# Patient Record
Sex: FEMALE | Race: WHITE | NOT HISPANIC OR LATINO | Employment: FULL TIME | URBAN - NONMETROPOLITAN AREA
[De-identification: names, ages, dates, MRNs, and addresses within clinical notes are randomized per-mention and may not be internally consistent; named-entity substitution may affect disease eponyms.]

---

## 2017-03-20 ENCOUNTER — SURGERY (OUTPATIENT)
Dept: SURGERY | Facility: OTHER | Age: 37
End: 2017-03-20

## 2017-03-20 ENCOUNTER — HISTORY (OUTPATIENT)
Dept: EMERGENCY MEDICINE | Facility: OTHER | Age: 37
End: 2017-03-20

## 2017-03-21 ENCOUNTER — HISTORY (OUTPATIENT)
Dept: MEDSURG UNIT | Facility: OTHER | Age: 37
End: 2017-03-21

## 2017-03-24 ENCOUNTER — COMMUNICATION - GICH (OUTPATIENT)
Dept: OBGYN | Facility: OTHER | Age: 37
End: 2017-03-24

## 2017-03-24 DIAGNOSIS — Z98.890 OTHER SPECIFIED POSTPROCEDURAL STATES: ICD-10-CM

## 2017-03-24 DIAGNOSIS — R19.00 INTRA-ABDOMINAL AND PELVIC SWELLING, MASS AND LUMP, UNSPECIFIED SITE: ICD-10-CM

## 2017-03-24 DIAGNOSIS — N80.9 ENDOMETRIOSIS: ICD-10-CM

## 2017-03-27 ENCOUNTER — COMMUNICATION - GICH (OUTPATIENT)
Dept: OBGYN | Facility: OTHER | Age: 37
End: 2017-03-27

## 2017-03-27 DIAGNOSIS — Z98.890 OTHER SPECIFIED POSTPROCEDURAL STATES: ICD-10-CM

## 2017-04-03 ENCOUNTER — HISTORY (OUTPATIENT)
Dept: OBGYN | Facility: OTHER | Age: 37
End: 2017-04-03

## 2017-04-03 ENCOUNTER — OFFICE VISIT - GICH (OUTPATIENT)
Dept: OBGYN | Facility: OTHER | Age: 37
End: 2017-04-03

## 2017-04-03 DIAGNOSIS — Z48.89 ENCOUNTER FOR OTHER SPECIFIED SURGICAL AFTERCARE (CODE): ICD-10-CM

## 2017-04-11 ENCOUNTER — COMMUNICATION - GICH (OUTPATIENT)
Dept: OBGYN | Facility: OTHER | Age: 37
End: 2017-04-11

## 2017-04-17 ENCOUNTER — HISTORY (OUTPATIENT)
Dept: OBGYN | Facility: OTHER | Age: 37
End: 2017-04-17

## 2017-04-17 ENCOUNTER — OFFICE VISIT - GICH (OUTPATIENT)
Dept: OBGYN | Facility: OTHER | Age: 37
End: 2017-04-17

## 2017-04-17 DIAGNOSIS — E89.40 ASYMPTOMATIC POSTPROCEDURAL OVARIAN FAILURE: ICD-10-CM

## 2017-04-17 DIAGNOSIS — Z48.89 ENCOUNTER FOR OTHER SPECIFIED SURGICAL AFTERCARE (CODE): ICD-10-CM

## 2017-04-17 ASSESSMENT — PATIENT HEALTH QUESTIONNAIRE - PHQ9: SUM OF ALL RESPONSES TO PHQ QUESTIONS 1-9: 11

## 2017-04-18 ENCOUNTER — COMMUNICATION - GICH (OUTPATIENT)
Dept: OBGYN | Facility: OTHER | Age: 37
End: 2017-04-18

## 2017-04-18 DIAGNOSIS — E89.40 ASYMPTOMATIC POSTPROCEDURAL OVARIAN FAILURE: ICD-10-CM

## 2017-05-03 ENCOUNTER — HISTORY (OUTPATIENT)
Dept: OBGYN | Facility: OTHER | Age: 37
End: 2017-05-03

## 2017-05-03 ENCOUNTER — OFFICE VISIT - GICH (OUTPATIENT)
Dept: OBGYN | Facility: OTHER | Age: 37
End: 2017-05-03

## 2017-05-03 ENCOUNTER — COMMUNICATION - GICH (OUTPATIENT)
Dept: OBGYN | Facility: OTHER | Age: 37
End: 2017-05-03

## 2017-05-03 DIAGNOSIS — E89.40 ASYMPTOMATIC POSTPROCEDURAL OVARIAN FAILURE: ICD-10-CM

## 2017-05-03 DIAGNOSIS — Z48.89 ENCOUNTER FOR OTHER SPECIFIED SURGICAL AFTERCARE (CODE): ICD-10-CM

## 2017-12-08 ENCOUNTER — COMMUNICATION - GICH (OUTPATIENT)
Dept: OBGYN | Facility: OTHER | Age: 37
End: 2017-12-08

## 2017-12-08 DIAGNOSIS — Z79.890 HORMONE REPLACEMENT THERAPY (POSTMENOPAUSAL): ICD-10-CM

## 2017-12-08 DIAGNOSIS — E89.40 ASYMPTOMATIC POSTPROCEDURAL OVARIAN FAILURE: ICD-10-CM

## 2018-01-02 ENCOUNTER — COMMUNICATION - GICH (OUTPATIENT)
Dept: FAMILY MEDICINE | Facility: OTHER | Age: 38
End: 2018-01-02

## 2018-01-02 ENCOUNTER — COMMUNICATION - GICH (OUTPATIENT)
Dept: OBGYN | Facility: OTHER | Age: 38
End: 2018-01-02

## 2018-01-02 DIAGNOSIS — E89.40 ASYMPTOMATIC POSTPROCEDURAL OVARIAN FAILURE: ICD-10-CM

## 2018-01-02 DIAGNOSIS — Z79.890 HORMONE REPLACEMENT THERAPY (POSTMENOPAUSAL): ICD-10-CM

## 2018-01-04 NOTE — PROGRESS NOTES
Patient Information     Patient Name MRN Marlee Squires [id number] Female 1980      Progress Notes by Mack Lynn MD at 4/3/2017  2:30 PM     Author:  Mack Lynn MD Service:  (none) Author Type:  Physician     Filed:  4/3/2017  3:05 PM Encounter Date:  4/3/2017 Status:  Signed     :  Mack Lynn MD (Physician)            Marlee Wise a post-operative check after a laparotomy with BSO for endometriosis.  She is 2 week(s) status postop.  She reports doing well and denies fever, pain, or significant bleeding. She admits hot flashes & night sweats.    OBJECTIVE: /80  Pulse 72  LMP 2017 (Approximate)  Breastfeeding? No    Abd: soft, non-tender, non-distended.  Incision: clear, dry, and intact without evidence of infection.      HEMOGLOBIN                (g/dL)    Date Value   2017 13.8        ASSESSMENT :  Normal postop check.  Doing well.  Restrictions reviewed.  Will slowly advance to normal activity. Discussed smoking cessation.  Not ready to set quit date yet.    Plan:  Follow up in 2 weeks.  Increase Estradiol to 2 mg a day.  No work yet.    Mack Lynn MD  3:00 PM 4/3/2017

## 2018-01-04 NOTE — NURSING NOTE
Patient Information     Patient Name MRN Sex Marlee Joseph [id number] Female 1980      Nursing Note by Khanh Logan LPN at 2017  3:45 PM     Author:  Khanh Logan LPN Service:  (none) Author Type:  NURS- Licensed Practical Nurse     Filed:  2017  4:11 PM Encounter Date:  2017 Status:  Signed     :  Khanh Logan LPN (NURS- Licensed Practical Nurse)            Patient presents to the clinic for her 4 week post op. Hormonal and left side is now bugging her.  Khanh Logan LPN ..............2017 3:53 PM

## 2018-01-04 NOTE — NURSING NOTE
Patient Information     Patient Name MRN Sex Marlee Joseph [id number] Female 1980      Nursing Note by Khanh Logan LPN at 4/3/2017  2:30 PM     Author:  Khanh Logan LPN Service:  (none) Author Type:  NURS- Licensed Practical Nurse     Filed:  4/3/2017  2:20 PM Encounter Date:  4/3/2017 Status:  Signed     :  Khanh Logan LPN (NURS- Licensed Practical Nurse)            Patient presents to the clinic for her 2 week post op.  Khanh Logan LPN ..............4/3/2017 2:20 PM

## 2018-01-04 NOTE — TELEPHONE ENCOUNTER
Patient Information     Patient Name MRN Marlee Squires [id number] Female 1980      Telephone Encounter by Basia Alcantar at 3/27/2017 10:12 AM     Author:  Basia Alcantar Service:  (none) Author Type:  (none)     Filed:  3/27/2017 10:16 AM Encounter Date:  3/27/2017 Status:  Signed     :  Basia Alcantar            Pt states she has some bleeding when wiping and blood in toilet after urinating, right side pain at a 9/10 when she wakes up. Pt explains that she  is out of pain medication, last dose was 3/26/17 PM. Pt is having a hard time sleeping due to pain. Please advise.  Basia Alcantar LPN ....................  3/27/2017   10:15 AM

## 2018-01-04 NOTE — TELEPHONE ENCOUNTER
Patient Information     Patient Name MRN Sex Marlee Joseph [id number] Female 1980      Telephone Encounter by Carly Knight at 3/27/2017  3:19 PM     Author:  Carly Knight Service:  (none) Author Type:  NURS- Registered Nurse     Filed:  3/27/2017  4:28 PM Encounter Date:  3/27/2017 Status:  Signed     :  Carly Knight (NURS- Registered Nurse)            Rx placed up in unit 4 patient will bring a picture ID.  Patient has had some bleeding, patient will come in if going through a super pad and hour for 2 hours or if is having increased pain will go to the ED.  Carly Knight RN .............. 3/27/2017  4:27 PM

## 2018-01-04 NOTE — TELEPHONE ENCOUNTER
Patient Information     Patient Name MRN Sex Marlee Joseph [id number] Female 1980      Telephone Encounter by Carly Knight at 2017  9:36 AM     Author:  Carly Knight Service:  (none) Author Type:  NURS- Registered Nurse     Filed:  2017  9:46 AM Encounter Date:  2017 Status:  Signed     :  Carly Knight (NURS- Registered Nurse)            Patient is needing a note for work surgery was 2017 and needs to be off 6 weeks tentatively returning to work on 2017.  Carly Knight RN .............. 2017  9:39 AM

## 2018-01-04 NOTE — TELEPHONE ENCOUNTER
Patient Information     Patient Name MRN Sex Marlee Joseph [id number] Female 1980      Telephone Encounter by Carly Knight at 3/24/2017  4:05 PM     Author:  Carly Knight Service:  (none) Author Type:  NURS- Registered Nurse     Filed:  3/24/2017  4:06 PM Encounter Date:  3/24/2017 Status:  Signed     :  Carly Knight (NURS- Registered Nurse)            Called patient Rx at unit 4 window she will bring a picture ID.  Carly Knight RN .............. 3/24/2017  4:06 PM

## 2018-01-04 NOTE — TELEPHONE ENCOUNTER
Patient Information     Patient Name MRN Sex Marlee Joseph [id number] Female 1980      Telephone Encounter by Carly Knight at 3/24/2017  9:33 AM     Author:  Carly Knight Service:  (none) Author Type:  NURS- Registered Nurse     Filed:  3/24/2017  9:46 AM Encounter Date:  3/24/2017 Status:  Signed     :  Carly Knight (NURS- Registered Nurse)            Patient had surgery with Dr. Lynn on 2017. Exploratory Laparotomy with Removal of Pelvic Mass, Bilateral Salpingoopherectomy.  Patient is still having pain and would like a refill of Percocet for the weekend does want to have to come in over the weekend.  Carly Knight RN .............. 3/24/2017  9:45 AM

## 2018-01-04 NOTE — ADDENDUM NOTE
Patient Information     Patient Name MRN Sex Marlee Joseph [id number] Female 1980      Addendum Note by Matheus Lynn MD at 5/3/2017 10:21 AM     Author:  Matheus Lynn MD Service:  (none) Author Type:  Physician     Filed:  5/3/2017 10:21 AM Encounter Date:  5/3/2017 Status:  Signed     :  Matheus Lynn MD (Physician)       Addended by: MATHEUS LYNN on: 5/3/2017 10:21 AM        Modules accepted: Orders

## 2018-01-04 NOTE — NURSING NOTE
Patient Information     Patient Name MRN Sex Marlee Joseph [id number] Female 1980      Nursing Note by Khanh Logan LPN at 5/3/2017  9:15 AM     Author:  Khanh Logan LPN Service:  (none) Author Type:  NURS- Licensed Practical Nurse     Filed:  5/3/2017  9:05 AM Encounter Date:  5/3/2017 Status:  Signed     :  Khanh Logan LPN (NURS- Licensed Practical Nurse)            Patient presents to the clinic for her 6 week post op follow up.  Khanh Logan LPN ..............5/3/2017 9:05 AM

## 2018-01-04 NOTE — TELEPHONE ENCOUNTER
Patient Information     Patient Name MRN Marlee Squires [id number] Female 1980      Telephone Encounter by Carly Knight at 2017  4:02 PM     Author:  Carly Knight Service:  (none) Author Type:  NURS- Registered Nurse     Filed:  2017  4:03 PM Encounter Date:  2017 Status:  Signed     :  Carly Knight (NURS- Registered Nurse)            Patient is needing a note for work in regards to how long she will be off.  Carly Knight RN .............. 2017  4:03 PM

## 2018-01-04 NOTE — TELEPHONE ENCOUNTER
Patient Information     Patient Name MRN Marlee Squires [id number] Female 1980      Telephone Encounter by Mack Lynn MD at 2017  2:32 PM     Author:  Mack Lynn MD Service:  (none) Author Type:  Physician     Filed:  2017  2:32 PM Encounter Date:  2017 Status:  Signed     :  Mack Lynn MD (Physician)            New Rx for Enjuvia sent to her pharmacy.  Please let her know.    Mack Lynn MD ....................  2017   2:32 PM

## 2018-01-04 NOTE — ADDENDUM NOTE
Patient Information     Patient Name MRN Sex Marlee Joseph [id number] Female 1980      Addendum Note by Matheus Lynn MD at 3/27/2017  2:43 PM     Author:  Matheus Lynn MD Service:  (none) Author Type:  Physician     Filed:  3/27/2017  2:43 PM Encounter Date:  3/27/2017 Status:  Signed     :  Matheus Lynn MD (Physician)       Addended by: MATHEUS LYNN on: 3/27/2017 02:43 PM        Modules accepted: Orders

## 2018-01-04 NOTE — TELEPHONE ENCOUNTER
Patient Information     Patient Name MRN Marlee Squires [id number] Female 1980      Telephone Encounter by Carly Knight at 2017  2:04 PM     Author:  Carly Knight Service:  (none) Author Type:  NURS- Registered Nurse     Filed:  2017  2:09 PM Encounter Date:  2017 Status:  Signed     :  Carly Knight (NURS- Registered Nurse)            You prescribed estrogens conj synthetic (CENESTIN) 1.25 mg tablet.  This medication was discontinued by the  and is no longer made.  Needs new medication sent to [facility].  Carly Knight RN .............. 2017  2:07 PM

## 2018-01-04 NOTE — PROGRESS NOTES
Patient Information     Patient Name MRN Sex Marlee Joseph [id number] Female 1980      Progress Notes by Mack Lynn MD at 5/3/2017  9:15 AM     Author:  Mack Lynn MD Service:  (none) Author Type:  Physician     Filed:  5/3/2017  9:28 AM Encounter Date:  5/3/2017 Status:  Signed     :  Mack Lynn MD (Physician)            Marlee Wise a post-operative check after an exploratory laparotomy and BSO for stage 4 endometriosis.  She is 6 week(s) status postop.  She reports doing well and denies fever, pain, or significant bleeding.     OBJECTIVE: /80  Pulse 80  Breastfeeding? No    Abd: soft, non-tender, non-distended.  Incision: clear, dry, and intact without evidence of infection.      HEMOGLOBIN                (g/dL)    Date Value   2017 13.8        ASSESSMENT :  Normal postop check  Doing well.  Restrictions reviewed.  Contraception discussed.  Will advance to normal activity.    Plan:  Follow up prn.    Mack Lynn MD  9:22 AM 5/3/2017

## 2018-01-04 NOTE — TELEPHONE ENCOUNTER
Patient Information     Patient Name MRN Marlee Squires [id number] Female 1980      Telephone Encounter by Mack Lynn MD at 5/3/2017 10:21 AM     Author:  Mack Lynn MD Service:  (none) Author Type:  Physician     Filed:  5/3/2017 10:22 AM Encounter Date:  5/3/2017 Status:  Signed     :  Mack Lynn MD (Physician)            New Rx for ultra low dose pill with both estrogen & progesterone sent to her pharmacy.  Please let her know.    Mack Lynn MD ....................  5/3/2017   10:22 AM

## 2018-01-04 NOTE — PROGRESS NOTES
Patient Information     Patient Name MRN Sex Marlee Joseph [id number] Female 1980      Progress Notes by Mack Lynn MD at 2017  3:45 PM     Author:  Mack Lynn MD Service:  (none) Author Type:  Physician     Filed:  2017  4:46 PM Encounter Date:  2017 Status:  Signed     :  Mack Lynn MD (Physician)            Marlee Wise a post-operative check after an abdominal BSO.  She is 4 week(s) status postop.  She reports doing ok and denies fever or significant bleeding. She admits continued lower abdominal pain, hot flashes & night sweats.    OBJECTIVE: /90  Pulse 80  LMP 2017 (Approximate)  Breastfeeding? No    Abd: soft, minimally tender, non-distended.  Incision: clear, dry, and intact without evidence of infection with tenderness along the incision line..      HEMOGLOBIN                (g/dL)    Date Value   2017 13.8        ASSESSMENT :    ICD-10-CM    1. Surgical menopause E89.40 estrogens conj synthetic (CENESTIN) 1.25 mg tablet   2. Post op pain    Doing well.  Restrictions reviewed.  Trial of Cenestion rather than estradiol.  Will continue advance to normal activity.    Plan:  Follow up in 2 weeks or as needed.    Mack Lynn MD  4:42 PM 2017

## 2018-01-04 NOTE — TELEPHONE ENCOUNTER
Patient Information     Patient Name MRN Sex Marlee Joseph [id number] Female 1980      Telephone Encounter by Carly Knight at 2017  9:44 AM     Author:  Carly Knight Service:  (none) Author Type:  NURS- Registered Nurse     Filed:  2017  9:46 AM Encounter Date:  2017 Status:  Signed     :  Carly Knight (NURS- Registered Nurse)            Letter done printed given to Dr. Lynn to be signed.  Carly Knight RN .............. 2017  9:45 AM

## 2018-01-25 ENCOUNTER — DOCUMENTATION ONLY (OUTPATIENT)
Dept: FAMILY MEDICINE | Facility: OTHER | Age: 38
End: 2018-01-25

## 2018-01-25 PROBLEM — Z98.890 S/P EXPLORATORY LAPAROTOMY: Status: ACTIVE | Noted: 2018-01-25

## 2018-01-25 PROBLEM — E89.40 SURGICAL MENOPAUSE ON HORMONE REPLACEMENT THERAPY: Status: ACTIVE | Noted: 2018-01-25

## 2018-01-25 PROBLEM — N80.9 ENDOMETRIOSIS: Status: ACTIVE | Noted: 2018-01-25

## 2018-01-25 PROBLEM — Z79.890 SURGICAL MENOPAUSE ON HORMONE REPLACEMENT THERAPY: Status: ACTIVE | Noted: 2018-01-25

## 2018-01-25 RX ORDER — ESTRADIOL 1 MG/1
2 TABLET ORAL DAILY
COMMUNITY
Start: 2018-01-02 | End: 2018-07-30

## 2018-01-26 VITALS — DIASTOLIC BLOOD PRESSURE: 80 MMHG | SYSTOLIC BLOOD PRESSURE: 120 MMHG | HEART RATE: 72 BPM

## 2018-01-26 VITALS — SYSTOLIC BLOOD PRESSURE: 120 MMHG | HEART RATE: 80 BPM | DIASTOLIC BLOOD PRESSURE: 80 MMHG

## 2018-01-26 VITALS — SYSTOLIC BLOOD PRESSURE: 130 MMHG | HEART RATE: 80 BPM | DIASTOLIC BLOOD PRESSURE: 90 MMHG

## 2018-01-31 ASSESSMENT — PATIENT HEALTH QUESTIONNAIRE - PHQ9: SUM OF ALL RESPONSES TO PHQ QUESTIONS 1-9: 11

## 2018-02-12 NOTE — TELEPHONE ENCOUNTER
Patient Information     Patient Name MRN Sex Marlee Joseph [id number] Female 1980      Telephone Encounter by iNmo Sanchez RN at 2018  2:07 PM     Author:  Nimo Sanchez RN Service:  (none) Author Type:  NURS- Registered Nurse     Filed:  2018  2:07 PM Encounter Date:  2018 Status:  Signed     :  Nimo Sanchez RN (NURS- Registered Nurse)            After name and date of birth were verified, patient notified.  Nimo Sanchez RN ....................  2018   2:07 PM

## 2018-02-12 NOTE — TELEPHONE ENCOUNTER
Patient Information     Patient Name MRN Marlee Squires [id number] Female 1980      Telephone Encounter by Mack Lynn MD at 2018  2:00 PM     Author:  Mack Lynn MD Service:  (none) Author Type:  Physician     Filed:  2018  2:00 PM Encounter Date:  2018 Status:  Signed     :  Mack Lynn MD (Physician)            Order signed.  Please let her know.    Mack Lynn MD ....................  2018   2:00 PM

## 2018-02-12 NOTE — TELEPHONE ENCOUNTER
Patient Information     Patient Name MRN Marlee Squires [id number] Female 1980      Telephone Encounter by Loren Collins RN at 2017 10:19 AM     Author:  Loren Collins RN Service:  (none) Author Type:  NURS- Registered Nurse     Filed:  2017 10:24 AM Encounter Date:  2017 Status:  Signed     :  Loren Collins RN (NURS- Registered Nurse)            Pharmacy calling as patient was prescribed synthetic conjugated estrogen (Enjuvia) on 4/3/2017. This medication has been discontinued by the . Pharmacy requesting an alternative be sent in - possibly premarin. Please advise.  Loren Collins RN............. 2017 10:21 AM

## 2018-02-12 NOTE — TELEPHONE ENCOUNTER
Patient Information     Patient Name MRN Marlee Squires [id number] Female 1980      Telephone Encounter by Nimo Sanchez RN at 2018  1:36 PM     Author:  Nimo Sanchez RN Service:  (none) Author Type:  NURS- Registered Nurse     Filed:  2018  1:56 PM Encounter Date:  2018 Status:  Signed     :  Nimo Sanchez RN (NURS- Registered Nurse)            Patient reports she has been taking estradiol 2 mg daily instead of the 1 mg prescribed. She is now out and unable to fill more due to insurance quantity limits. She was taking this dose in 2017, prior to switching to Enjuvia 1.25 mg daily.     Order khadar'd up for 2 mg daily. Please sign if appropriate.    Nimo Sanchez RN ....................  2018   1:54 PM

## 2018-02-12 NOTE — TELEPHONE ENCOUNTER
Patient Information     Patient Name MRN Marlee Squires [id number] Female 1980      Telephone Encounter by Loren Collins RN at 2017 11:46 AM     Author:  Loren Collins RN Service:  (none) Author Type:  NURS- Registered Nurse     Filed:  2017 11:48 AM Encounter Date:  2017 Status:  Signed     :  Loren Collins RN (NURS- Registered Nurse)            Pharmacy notified.  Loren Collins RN............. 2017 11:46 AM

## 2018-02-22 ENCOUNTER — NURSE TRIAGE (OUTPATIENT)
Dept: OBGYN | Facility: OTHER | Age: 38
End: 2018-02-22

## 2018-02-22 NOTE — TELEPHONE ENCOUNTER
Patient had BSO in March 2017. She now has cramping and bleeding. She has not had a cycle since surgery. Flow is light and cramping is 8/10, very similar to her regular cycles. Patient was offered routine office visit to follow up, declined at this time but will call back if bleeding is prolonged.    Nimo Sanchez RN...................2/22/2018 12:06 PM       Reason for Disposition    Postmenopausal vaginal bleeding (all triage questions negative)    Additional Information    Negative: Shock suspected (e.g., cold/pale/clammy skin, too weak to stand, low BP, rapid pulse)    Negative: Difficult to awaken or acting confused  (e.g., disoriented, slurred speech)    Negative: Passed out (i.e., lost consciousness, collapsed and was not responding)    Negative: Sounds like a life-threatening emergency to the triager    Negative: Followed a genital area injury    Negative: [1] Vaginal discharge is main symptom AND [2] small amount of blood    Negative: SEVERE abdominal pain    Negative: SEVERE dizziness (e.g., unable to stand, requires support to walk, feels like passing out now)    Negative: Sexual assault or rape    Negative: SEVERE vaginal bleeding (i.e., soaking 2 pads or tampons per hour and present 2 or more hours)    Negative: Patient sounds very sick or weak to the triager    Negative: MODERATE vaginal bleeding (i.e., soaking 1 pad or tampon per hour and present > 6 hours)    Negative: Pale skin (pallor) of new onset or worsening    Negative: [1] Constant abdominal pain AND [2] present > 2 hours    Negative: Taking Coumadin (warfarin) or other strong blood thinner, or known bleeding disorder (e.g., thrombocytopenia)    Negative: Bleeding with > 6 soaked pads or tampons per day    Negative: Bleeding lasts for > 7 days    Negative: [1] Bleeding/spotting after procedure (e.g., biopsy) or pelvic examination (e.g., pap smear) AND [2] lasts > 7 days    Answer Assessment - Initial Assessment Questions  1. AMOUNT:  "\"Describe the bleeding that you are having.\" \"How much bleeding is there?\"     - SPOTTING: spotting, or pinkish / brownish mucous discharge; does not fill panti-liner or pad     - MILD:  less than 1 pad / hour; less than patient's  menstrual bleeding when she still had menstrual periods    - MODERATE: 1-2 pads / hour; small-medium blood clots (e.g., pea, grape, small coin)     - SEVERE: soaking 2 or more pads/hour for 2 or more hours; bleeding not contained by pads or continuous red blood from vagina; large blood clots (e.g., golf ball, large coin)       light  2. ONSET: \"When did the bleeding begin?\" \"Is it continuing now?\"      Possibly yesterday afternoon, for sure this AM  3. MENOPAUSE: \"When was your last menstrual period?\"       February or March 2017  4. ABDOMINAL PAIN: \"Do you have any pain?\" \"How bad is the pain?\"  (e.g., Scale 1-10; mild, moderate, or severe)    - MILD (1-3): doesn't interfere with normal activities, abdomen soft and not tender to touch     - MODERATE (4-7): interferes with normal activities or awakens from sleep, tender to touch     - SEVERE (8-10): excruciating pain, doubled over, unable to do any normal activities       8/10  5. BLOOD THINNERS: \"Do you take any blood thinners?\" (e.g., Coumadin/warfarin, Pradaxa/dabigatran, aspirin)      no  6. HORMONES: \"Are you taking any hormone medications, prescription or OTC?\" (e.g., birth control pills, estrogen)      Estradiol  7. CAUSE: \"What do you think is causing the bleeding?\" (e.g., recent gyn surgery, recent gyn procedure; known bleeding disorder, uterine cancer)        Unknown  8. HEMODYNAMIC STATUS: \"Are you weak or feeling lightheaded?\" If so, ask: \"Can you stand and walk normally?\"        denies  9. OTHER SYMPTOMS: \"What other symptoms are you having with the bleeding?\" (e.g., back pain, burning with urination, fever)      no    Protocols used: VAGINAL BLEEDING - POSTMENOPAUSAL-ADULT-AH      "

## 2018-07-23 NOTE — PROGRESS NOTES
Patient Information     Patient Name  Marlee Wise MRN  [id number] Sex  Female   1980      Letter by Mack Lynn MD at      Author:  Mack Lynn MD Service:  (none) Author Type:  (none)    Filed:   Encounter Date:  2017 Status:  (Other)           Marlee Wise  [address] [place] [id number]          2017    To whom it may concern:    Patient has been under my care since 2017 due to surgery.   Patient is to be off for six weeks,tentatively returning to work on 2017.        Sincerely,         Mack Lynn MD, FACOG

## 2018-07-30 DIAGNOSIS — R19.00 PELVIC MASS IN FEMALE: Primary | ICD-10-CM

## 2018-07-30 NOTE — LETTER
July 31, 2018      Marlee Wise  [address]  [facility] [address]        Dear Marlee,     This letter is to remind you that you are due for your annual exam with OB/Gyn. Your last comprehensive visit was more than 12 months ago.    A LIMITED refill of Estradiol has been requested from our OB/Gyn team. Additional refills require you to complete this appointment.    Please call the clinic at [phone] to schedule your appointment.    If you should require additional refills before your scheduled appointment, please contact your pharmacy and we will refill your medication until the date of yourappointment.    If you are no longer utilizing [facility], please call to let us know. Doing so will remove you from our call/contact list.      Thank you for choosing [facility] for your health care needs.    Sincerely,    Refill RN  [facility]

## 2018-07-31 NOTE — TELEPHONE ENCOUNTER
Medication is being requested for 1 time refill only due to:  Patient needs to be seen because it has been more than one year since last visit.Letter sent as reminder to patient.       Unable to complete prescription refill per RN Medication Refill Policy due to absence of prescribing provider.................... Nimo Sanchez ....................  7/31/2018   4:44 PM

## 2018-08-01 RX ORDER — ESTRADIOL 1 MG/1
TABLET ORAL
Qty: 60 TABLET | Refills: 0 | Status: SHIPPED | OUTPATIENT
Start: 2018-08-01 | End: 2018-09-04

## 2018-08-13 NOTE — TELEPHONE ENCOUNTER
No appointment made 2 weeks after refill was authorized. Follow up call placed today and patient was assisted with scheduling appointment.    Nimo Sanchez RN...................8/13/2018 9:03 AM

## 2018-08-16 ENCOUNTER — OFFICE VISIT (OUTPATIENT)
Dept: OBGYN | Facility: OTHER | Age: 38
End: 2018-08-16
Attending: OBSTETRICS & GYNECOLOGY
Payer: COMMERCIAL

## 2018-08-16 VITALS — SYSTOLIC BLOOD PRESSURE: 106 MMHG | HEART RATE: 64 BPM | DIASTOLIC BLOOD PRESSURE: 70 MMHG

## 2018-08-16 DIAGNOSIS — N80.9 ENDOMETRIOSIS: Primary | ICD-10-CM

## 2018-08-16 DIAGNOSIS — R10.2 PELVIC PAIN IN FEMALE: ICD-10-CM

## 2018-08-16 PROCEDURE — 82670 ASSAY OF TOTAL ESTRADIOL: CPT | Performed by: OBSTETRICS & GYNECOLOGY

## 2018-08-16 PROCEDURE — 99213 OFFICE O/P EST LOW 20 MIN: CPT | Performed by: OBSTETRICS & GYNECOLOGY

## 2018-08-16 PROCEDURE — 36415 COLL VENOUS BLD VENIPUNCTURE: CPT | Performed by: OBSTETRICS & GYNECOLOGY

## 2018-08-16 PROCEDURE — G0463 HOSPITAL OUTPT CLINIC VISIT: HCPCS

## 2018-08-16 ASSESSMENT — PAIN SCALES - GENERAL: PAINLEVEL: NO PAIN (0)

## 2018-08-16 NOTE — MR AVS SNAPSHOT
After Visit Summary   8/16/2018    Marlee Wise    MRN: [id number]           Patient Information     Date Of Birth          1980        Visit Information        Provider Department      8/16/2018 1:15 PM Chuck Flor MD [facility]        Today's Diagnoses     Endometriosis    -  1    Pelvic pain in female           Follow-ups after your visit        Your next 10 appointments already scheduled     Aug 20, 2018  2:00 PM CDT   US PELVIC COMPLETE W TRANSVAGINAL with GHUS1   [facility] ([facility]    [address]  [place] [id number] [phone]           Please bring a list of your medicines (including vitamins, minerals and over-the-counter drugs). Also, tell your doctor about any allergies you may have. Wear comfortable clothes and leave your valuables at home.  Adults: Drink four 8-ounce glasses of fluid an hour before your exam. If you need to empty your bladder before your exam, try to release only a little urine. Then, drink another glass of fluid.  Children: Children who are potty trained up to 6 years old should drink at least 2 cups (16 oz) of water/non-carbonated beverage 30 minutes prior to the exam. Children who are 6-10 years should drink at least 3 cups (24 oz) of water/non-carbonated beverage 45 minutes prior to the exam. Children who are 10 years or older should drink at least 4 cups (32 oz) of water/non-carbonated beverage 45 minutes prior to the exam. If your child is very uncomfortable or has an urgent need to pee, please notify a technologist; they will try to find out how much longer the wait may be and provide instructions to help relieve the pressure.  Please call the Imaging Department at your exam site with any questions.            Aug 20, 2018  3:00 PM CDT   SHORT with Chuck Flor MD   [facility] ([facility]    [address]  [place] [address]   [phone]              Future tests that were ordered for you today     Open Future Orders        Priority Expected Expires Ordered    US Pelvic Complete with Transvaginal Routine  2019            Who to contact     If you have questions or need follow up information about today's clinic visit or your schedule please contact [facility] directly at [phone].  Normal or non-critical lab and imaging results will be communicated to you by Trendslidehart, letter or phone within 4 business days after the clinic has received the results. If you do not hear from us within 7 days, please contact the clinic through YeahMobit or phone. If you have a critical or abnormal lab result, we will notify you by phone as soon as possible.  Submit refill requests through Panjiva or call your pharmacy and they will forward the refill request to us. Please allow 3 business days for your refill to be completed.          Additional Information About Your Visit        Panjiva Information     Panjiva lets you send messages to your doctor, view your test results, renew your prescriptions, schedule appointments and more. To sign up, go to [email or web address] . Click on \"Log in\" on the left side of the screen, which will take you to the Welcome page. Then click on \"Sign up Now\" on the right side of the page.     You will be asked to enter the access code listed below, as well as some personal information. Please follow the directions to create your username and password.     Your access code is: [email or web address]  Expires: 2018  2:47 PM     Your access code will  in 90 days. If you need help or a new code, please call your [facility] clinic or [phone].        Care EveryWhere ID     This is your Care EveryWhere ID. This could be used by other organizations to access your [facility] medical records  [id number]        Your Vitals Were     Pulse Breastfeeding?          "       64 No           Blood Pressure from Last 3 Encounters:   08/16/18 106/70   05/03/17 120/80   04/17/17 130/90    Weight from Last 3 Encounters:   No data found for Wt              We Performed the Following     Estradiol        Primary Care Provider Office Phone # Fax #    Meseret Taylro [phone]       [facility] [address]  [facility] [address]        Equal Access to Services     [facility] Center[facility]ESS : Hadii aad ku hadasho Soomaali, waaxda luqadaha, qaybta kaalmada adeegyada, waxay idiin hayaan adeeg kharash la'aan ah. So [facility] [phone].    ATENCIÓN: Si habla español, tiene a bear disposición servicios gratuitos de asistencia lingüística. Llame al [phone].    We comply with applicable federal civil rights laws and [place] laws. We do not discriminate on the basis of race, color, national origin, age, disability, sex, sexual orientation, or gender identity.            Thank you!     Thank you for choosing [facility]  for your care. Our goal is always to provide you with excellent care. Hearing back from our patients is one way we can continue to improve our services. Please take a few minutes to complete the written survey that you may receive in the mail after your visit with us. Thank you!             Your Updated Medication List - Protect others around you: Learn how to safely use, store and throw away your medicines at www.disposemymeds.org.          This list is accurate as of 8/16/18  2:47 PM.  Always use your most recent med list.                   Brand Name Dispense Instructions for use Diagnosis    estradiol 1 MG tablet    ESTRACE    60 tablet    TAKE 2 TABLETS BY MOUTH EVERY DAY    Pelvic mass in female

## 2018-08-16 NOTE — NURSING NOTE
Patient present for consult - states feeling increased fatigue, bloated, and no sex drive.  She feels the current Estradiol is not effective.   Alisa Rosario LPN........................8/16/2018  1:39 PM

## 2018-08-16 NOTE — PROGRESS NOTES
Patient is a 38-year-old here to discuss estrogen therapy and discomfort in her abdomen and pelvis.  In March 2017 had a fairly urgent laparotomy by Dr. Lynn.  Stage IV endometriosis was noted.  A BSO with lysis of adhesions was performed.  Uterus was left in place.  Reviewing the op report showed that her right ovary was enlarged 10 cm and left is 7 cm.  She was then placed on daily estradiol initially 1 mg.  About 6 months ago she was up to 2 mg daily.    Still with some vasomotor type symptoms but has chronic feeling of feeling bloated and fullness lower in her abdomen.  Also quite fatigued.  Suffers from intermittent depression.    Has not taken any progesterone since her surgery despite the estrogen therapy.  Describes for times of having light bleeding.  Definite dyspareunia on a regular basis.    Denies any issues or problems with either bladder function or bowel function.    Problem list is reviewed on epic in addition to the above includes tobacco abuse    Medications and allergies reviewed on [employer]    GYN  and GI review of systems are as noted above otherwise negative    Physical exam:    Patient is alert oriented appears in no acute distress blood pressure 106/70 pulse is 64 weight is declined    Lymph node survey is negative    Abdomen is soft benign, mild discomfort in the lower abdomen.  Previous Pfannenstiel incision.  On pelvic exam external vagina and cervix are unremarkable bimanual exam shows a cervix that is fairly high uterus is globular tender midline no obvious adnexal masses but patient guarded a bit so it was difficult to ascertain for sure    Assessment:    1.  History stage IV endometriosis status post laparotomy with BSO, uterus intact    2.  Continuous estrogen replacement with symptoms that would be suggestive of hypoestrogenism.    3.  Intermittent bleeding spotting on continuous estrogen    4.  Dyspareunia    Plan:    The above discussed with the patient.  Will check a serum  estradiol to assess her absorption of the estradiol 2 mg.  We will also arrange up arrange a pelvic ultrasound to assess the uterus the endometrium and the adnexal regions.  Follow-up with the patient after the studies are back.

## 2018-08-16 NOTE — LETTER
8/16/2018       RE: Marlee Wise  [address] [facility] [address]     Dear Colleague,    Thank you for referring your patient, Marlee Wise, to the [facility] at [facility]. Please see a copy of my visit note below.    Patient is a 38-year-old here to discuss estrogen therapy and discomfort in her abdomen and pelvis.  In March 2017 had a fairly urgent laparotomy by Dr. Lynn.  Stage IV endometriosis was noted.  A BSO with lysis of adhesions was performed.  Uterus was left in place.  Reviewing the op report showed that her right ovary was enlarged 10 cm and left is 7 cm.  She was then placed on daily estradiol initially 1 mg.  About 6 months ago she was up to 2 mg daily.    Still with some vasomotor type symptoms but has chronic feeling of feeling bloated and fullness lower in her abdomen.  Also quite fatigued.  Suffers from intermittent depression.    Has not taken any progesterone since her surgery despite the estrogen therapy.  Describes for times of having light bleeding.  Definite dyspareunia on a regular basis.    Denies any issues or problems with either bladder function or bowel function.    Problem list is reviewed on epic in addition to the above includes tobacco abuse    Medications and allergies reviewed on [employer]    GYN  and GI review of systems are as noted above otherwise negative    Physical exam:    Patient is alert oriented appears in no acute distress blood pressure 106/70 pulse is 64 weight is declined    Lymph node survey is negative    Abdomen is soft benign, mild discomfort in the lower abdomen.  Previous Pfannenstiel incision.  On pelvic exam external vagina and cervix are unremarkable bimanual exam shows a cervix that is fairly high uterus is globular tender midline no obvious adnexal masses but patient guarded a bit so it was difficult to ascertain for sure    Assessment:    1.  History stage IV endometriosis status post  laparotomy with BSO, uterus intact    2.  Continuous estrogen replacement with symptoms that would be suggestive of hypoestrogenism.    3.  Intermittent bleeding spotting on continuous estrogen    4.  Dyspareunia    Plan:    The above discussed with the patient.  Will check a serum estradiol to assess her absorption of the estradiol 2 mg.  We will also arrange up arrange a pelvic ultrasound to assess the uterus the endometrium and the adnexal regions.  Follow-up with the patient after the studies are back.    Again, thank you for allowing me to participate in the care of your patient.      Sincerely,    Chuck Flor MD

## 2018-08-17 ENCOUNTER — APPOINTMENT (OUTPATIENT)
Dept: LAB | Facility: OTHER | Age: 38
End: 2018-08-17
Attending: OBSTETRICS & GYNECOLOGY
Payer: COMMERCIAL

## 2018-08-17 LAB — ESTRADIOL SERPL-MCNC: 61 PG/ML

## 2018-08-20 ENCOUNTER — HOSPITAL ENCOUNTER (OUTPATIENT)
Dept: ULTRASOUND IMAGING | Facility: OTHER | Age: 38
Discharge: HOME | End: 2018-08-20
Attending: OBSTETRICS & GYNECOLOGY | Admitting: OBSTETRICS & GYNECOLOGY
Payer: COMMERCIAL

## 2018-08-20 ENCOUNTER — OFFICE VISIT (OUTPATIENT)
Dept: OBGYN | Facility: OTHER | Age: 38
End: 2018-08-20
Attending: OBSTETRICS & GYNECOLOGY
Payer: COMMERCIAL

## 2018-08-20 VITALS — DIASTOLIC BLOOD PRESSURE: 60 MMHG | HEART RATE: 68 BPM | SYSTOLIC BLOOD PRESSURE: 110 MMHG

## 2018-08-20 DIAGNOSIS — N80.9 ENDOMETRIOSIS: ICD-10-CM

## 2018-08-20 DIAGNOSIS — R10.2 PELVIC PAIN IN FEMALE: ICD-10-CM

## 2018-08-20 DIAGNOSIS — N80.9 ENDOMETRIOSIS: Primary | ICD-10-CM

## 2018-08-20 PROCEDURE — G0463 HOSPITAL OUTPT CLINIC VISIT: HCPCS | Mod: 25

## 2018-08-20 PROCEDURE — 76830 TRANSVAGINAL US NON-OB: CPT

## 2018-08-20 PROCEDURE — 99214 OFFICE O/P EST MOD 30 MIN: CPT | Performed by: OBSTETRICS & GYNECOLOGY

## 2018-08-20 RX ORDER — ACETAMINOPHEN AND CODEINE PHOSPHATE 120; 12 MG/5ML; MG/5ML
1 SOLUTION ORAL DAILY
Qty: 84 TABLET | Refills: 1 | Status: SHIPPED | OUTPATIENT
Start: 2018-08-20 | End: 2018-10-29

## 2018-08-20 ASSESSMENT — PAIN SCALES - GENERAL: PAINLEVEL: NO PAIN (0)

## 2018-08-20 NOTE — PROGRESS NOTES
Brenda is here for follow-up.  Please refer to my dictation from 8/16/2018.    Her serum estradiol level on the 2 mg of estradiol orally returned at 61 which is below the comfort range of most women which is between .    Her ultrasound shows a normal sized uterus with an endometrial plate that is basically 5 mm meters but there is an area that is closer to 8 mm.  There are no adnexal masses appreciated    I discussed the above at length with the patient.  Again her biggest concern is pelvic pain and discomfort.  Again has a history of stage IV endometriosis. At the time of urgent surgery a BSO was performed but the uterus was left in place presumably presumably secondary to significant adhesive disease.  She has since been on unopposed estrogen with periodic bleeding.    Assessment:    1.  Pelvic discomfort.  Potential for persistent endometriosis involving peritoneal surfaces.  No recurrent endometrioma.  Known pelvic adhesive disease probably contributes to her discomfort as well.  2.  Symptoms of hypoestrogenism with serum level confirming.  3.  On unopposed estrogen    Plan: We will have her increase to 2-1/2 mg of estradiol daily.  Discussed need for progesterone either in an oral form or in IUD form.  Patient declines the IUD.  Therefore we will consider daily Norethindrone (Micronor).  If pain persists may have to consider a hysterectomy.    We will plan follow-up in 1 month to assess the above.  30 minutes spent face-to-face all of it in counseling

## 2018-08-20 NOTE — LETTER
8/20/2018       RE: Marlee Wise  [address] [facility] [address]     Dear Colleague,    Thank you for referring your patient, Marlee Wise, to the [facility] at [facility]. Please see a copy of my visit note below.    Brenda is here for follow-up.  Please refer to my dictation from 8/16/2018.    Her serum estradiol level on the 2 mg of estradiol orally returned at 61 which is below the comfort range of most women which is between .    Her ultrasound shows a normal sized uterus with an endometrial plate that is basically 5 mm meters but there is an area that is closer to 8 mm.  There are no adnexal masses appreciated    I discussed the above at length with the patient.  Again her biggest concern is pelvic pain and discomfort.  Again has a history of stage IV endometriosis. At the time of urgent surgery a BSO was performed but the uterus was left in place presumably presumably secondary to significant adhesive disease.  She has since been on unopposed estrogen with periodic bleeding.    Assessment:    1.  Pelvic discomfort.  Potential for persistent endometriosis involving peritoneal surfaces.  No recurrent endometrioma.  Known pelvic adhesive disease probably contributes to her discomfort as well.  2.  Symptoms of hypoestrogenism with serum level confirming.  3.  On unopposed estrogen    Plan: We will have her increase to 2-1/2 mg of estradiol daily.  Discussed need for progesterone either in an oral form or in IUD form.  Patient declines the IUD.  Therefore we will consider daily Norethindrone (Micronor).  If pain persists may have to consider a hysterectomy.    We will plan follow-up in 1 month to assess the above.  30 minutes spent face-to-face all of it in counseling    Again, thank you for allowing me to participate in the care of your patient.      Sincerely,    Chuck Flor MD

## 2018-08-20 NOTE — MR AVS SNAPSHOT
After Visit Summary   8/20/2018    Marlee Wise    MRN: [id number]           Patient Information     Date Of Birth          1980        Visit Information        Provider Department      8/20/2018 3:15 PM Chuck Flor MD [facility]        Today's Diagnoses     Endometriosis    -  1    Pelvic pain in female           Follow-ups after your visit        Who to contact     If you have questions or need follow up information about today's clinic visit or your schedule please contact [facility] directly at [phone].  Normal or non-critical lab and imaging results will be communicated to you by Brevityhart, letter or phone within 4 business days after the clinic has received the results. If you do not hear from us within 7 days, please contact the clinic through Cabe na Mala or phone. If you have a critical or abnormal lab result, we will notify you by phone as soon as possible.  Submit refill requests through Cabe na Mala or call your pharmacy and they will forward the refill request to us. Please allow 3 business days for your refill to be completed.          Additional Information About Your Visit        MyChart Information     Cabe na Mala gives you secure access to your electronic health record. If you see a primary care provider, you can also send messages to your care team and make appointments. If you have questions, please call your primary care clinic.  If you do not have a primary care provider, please call [phone] and they will assist you.        Care EveryWhere ID     This is your Care EveryWhere ID. This could be used by other organizations to access your [place] medical records  [id number]        Your Vitals Were     Pulse Breastfeeding?                68 No           Blood Pressure from Last 3 Encounters:   08/20/18 110/60   08/16/18 106/70   05/03/17 120/80    Weight from Last 3 Encounters:   No data found for Wt              Today, you had the  following     No orders found for display         Today's Medication Changes          These changes are accurate as of 8/20/18  6:22 PM.  If you have any questions, ask your nurse or doctor.               Start taking these medicines.        Dose/Directions    norethindrone 0.35 MG per tablet   Commonly known as:  MICRONOR   Used for:  Endometriosis   Started by:  Chuck Flor MD        Dose:  1 tablet   Take 1 tablet (0.35 mg) by mouth daily   Quantity:  84 tablet   Refills:  1            Where to get your medicines      These medications were sent to [id number] - [place], [place] - [address]  [address], [facility] [address]     Phone:  [phone]     norethindrone 0.35 MG per tablet                Primary Care Provider Office Phone # Fax #    Meseret Taylor [phone]       [facility] [address] [facility] [address]        Equal Access to Services     [facility] CampusT[facility] AH: Hadii rachel espinoza hadasho Soomaali, waaxda luqadaha, qaybta kaalmada adeegyada, jose alejandro arroyon jessica reynaga . So [facility] [phone].    ATENCIÓN: Si habla español, tiene a bear disposición servicios gratuitos de asistencia lingüística. LlSt. Anthony's Hospital [phone].    We comply with applicable federal civil rights laws and [place] laws. We do not discriminate on the basis of race, color, national origin, age, disability, sex, sexual orientation, or gender identity.            Thank you!     Thank you for choosing [facility] AND \[facility]\""  for your care. Our goal is always to provide you with excellent care. Hearing back from our patients is one way we can continue to improve our services. Please take a few minutes to complete the written survey that you may receive in the mail after your visit with us. Thank you!             Your Updated Medication List - Protect others around you: Learn how to safely use, store and throw away your medicines at  www.disposemymeds.org.          This list is accurate as of 8/20/18  6:22 PM.  Always use your most recent med list.                   Brand Name Dispense Instructions for use Diagnosis    estradiol 1 MG tablet    ESTRACE    60 tablet    TAKE 2 TABLETS BY MOUTH EVERY DAY    Pelvic mass in female       norethindrone 0.35 MG per tablet    MICRONOR    84 tablet    Take 1 tablet (0.35 mg) by mouth daily    Endometriosis

## 2018-08-21 ENCOUNTER — HEALTH MAINTENANCE LETTER (OUTPATIENT)
Age: 38
End: 2018-08-21

## 2018-09-04 DIAGNOSIS — R19.00 PELVIC MASS IN FEMALE: ICD-10-CM

## 2018-09-04 RX ORDER — ESTRADIOL 1 MG/1
2.5 TABLET ORAL DAILY
Qty: 75 TABLET | Refills: 0 | Status: SHIPPED | OUTPATIENT
Start: 2018-09-04 | End: 2018-10-09

## 2018-09-04 NOTE — TELEPHONE ENCOUNTER
Per last visit note, patient to increase estradiol to 2.5 mg daily, start Micronor and follow up in 1 month. Limited refill provided.    Nimo Sanchez RN...................9/4/2018 4:17 PM    
77.1

## 2018-10-02 DIAGNOSIS — R19.00 PELVIC MASS IN FEMALE: ICD-10-CM

## 2018-10-02 RX ORDER — ESTRADIOL 1 MG/1
TABLET ORAL
Qty: 75 TABLET | Refills: 0 | OUTPATIENT
Start: 2018-10-02

## 2018-10-02 NOTE — TELEPHONE ENCOUNTER
"[facility] and [facility] request a Rx refill for Estradiol (Estrace).  Hormone replacement therapy protocol completed.  Pt's last exam with Dr. JUANIS Flor was on 8-20-18 where MD stated to follow up in one month.  This RN telephoned pt inquiring about above.  Pt states she \"has some\" Estrace pills left and will agree to be examined and follow up with MD.  Pt given appt time on 10-4-15 at 1530.  Pt verbalizes understanding.  Will hold on Rx refill until MD to examine pt.  Krysta Suero, RN on 10/2/2018 at 2:39 PM    "

## 2018-10-09 ENCOUNTER — OFFICE VISIT (OUTPATIENT)
Dept: OBGYN | Facility: OTHER | Age: 38
End: 2018-10-09
Attending: OBSTETRICS & GYNECOLOGY
Payer: COMMERCIAL

## 2018-10-09 VITALS — SYSTOLIC BLOOD PRESSURE: 118 MMHG | HEART RATE: 84 BPM | DIASTOLIC BLOOD PRESSURE: 76 MMHG

## 2018-10-09 DIAGNOSIS — R10.2 PELVIC PAIN IN FEMALE: ICD-10-CM

## 2018-10-09 DIAGNOSIS — N80.9 ENDOMETRIOSIS: Primary | ICD-10-CM

## 2018-10-09 PROCEDURE — 99214 OFFICE O/P EST MOD 30 MIN: CPT | Performed by: OBSTETRICS & GYNECOLOGY

## 2018-10-09 RX ORDER — ESTRADIOL 1 MG/1
2.5 TABLET ORAL DAILY
Qty: 75 TABLET | Refills: 0 | Status: SHIPPED | OUTPATIENT
Start: 2018-10-09 | End: 2018-11-04

## 2018-10-09 ASSESSMENT — ANXIETY QUESTIONNAIRES
7. FEELING AFRAID AS IF SOMETHING AWFUL MIGHT HAPPEN: SEVERAL DAYS
GAD7 TOTAL SCORE: 18
1. FEELING NERVOUS, ANXIOUS, OR ON EDGE: NEARLY EVERY DAY
6. BECOMING EASILY ANNOYED OR IRRITABLE: NEARLY EVERY DAY
3. WORRYING TOO MUCH ABOUT DIFFERENT THINGS: NEARLY EVERY DAY
2. NOT BEING ABLE TO STOP OR CONTROL WORRYING: NEARLY EVERY DAY
IF YOU CHECKED OFF ANY PROBLEMS ON THIS QUESTIONNAIRE, HOW DIFFICULT HAVE THESE PROBLEMS MADE IT FOR YOU TO DO YOUR WORK, TAKE CARE OF THINGS AT HOME, OR GET ALONG WITH OTHER PEOPLE: EXTREMELY DIFFICULT
5. BEING SO RESTLESS THAT IT IS HARD TO SIT STILL: MORE THAN HALF THE DAYS

## 2018-10-09 ASSESSMENT — PATIENT HEALTH QUESTIONNAIRE - PHQ9: 5. POOR APPETITE OR OVEREATING: NEARLY EVERY DAY

## 2018-10-09 ASSESSMENT — PAIN SCALES - GENERAL: PAINLEVEL: NO PAIN (0)

## 2018-10-09 NOTE — NURSING NOTE
Date of Surgery: 11/8/18  Type of Surgery: Diagnostic Laparoscopy with Lysis of adhesions  Surgeon: Dr. Chuck Flor MD      Patient's consents were signed and appropriate appointments were scheduled by the [facility] . Patient was given surgical folder which includes pre-operative bathing instructions related to the two packets of Hibiclens surgical prep provided. Surgical forms were faxed to [phone] and [phone], copies made and kept for informative purposes, and originals were delivered to Day-surgery. Patient denies questions at this time.    Nimo Sanchez RN...................10/9/2018 1:40 PM

## 2018-10-09 NOTE — MR AVS SNAPSHOT
After Visit Summary   10/9/2018    Marlee Wise    MRN: [id number]           Patient Information     Date Of Birth          1980        Visit Information        Provider Department      10/9/2018 12:45 PM Chuck Flor MD [facility]        Today's Diagnoses     Endometriosis    -  1    Pelvic pain in female           Follow-ups after your visit        Your next 10 appointments already scheduled     Oct 29, 2018 11:15 AM CDT   Office Visit with Sameera Vu PA-C   [facility] ([facility])    [address]  [place] [id number] [phone]           Bring a current list of meds and any records pertaining to this visit. For Physicals, please bring immunization records and any forms needing to be filled out. Please arrive 10 minutes early to complete paperwork.            Nov 15, 2018  3:00 PM CST   SHORT with Chuck Flor MD   [facility] ([facility])    [address]  [place] [id number] [phone]              Who to contact     If you have questions or need follow up information about today's clinic visit or your schedule please contact [facility] directly at [phone].  Normal or non-critical lab and imaging results will be communicated to you by [employer], letter or phone within 4 business days after the clinic has received the results. If you do not hear from us within 7 days, please contact the clinic through [employer] or phone. If you have a critical or abnormal lab result, we will notify you by phone as soon as possible.  Submit refill requests through [employer] or call your pharmacy and they will forward the refill request to us. Please allow 3 business days for your refill to be completed.          Additional Information About Your Visit        [employer] Information     [employer] gives you secure access to your electronic  health record. If you see a primary care provider, you can also send messages to your care team and make appointments. If you have questions, please call your primary care clinic.  If you do not have a primary care provider, please call [phone] and they will assist you.        Care EveryWhere ID     This is your Care EveryWhere ID. This could be used by other organizations to access your [facility] medical records  [id number]        Your Vitals Were     Pulse                   84            Blood Pressure from Last 3 Encounters:   10/09/18 118/76   08/20/18 110/60   08/16/18 106/70    Weight from Last 3 Encounters:   No data found for Wt              Today, you had the following     No orders found for display         Where to get your medicines      These medications were sent to [id number] - [place], [place] - [address]  [address], [facility] [address]     Phone:  [phone]     estradiol 1 MG tablet          Primary Care Provider Office Phone # Fax #    Meseret HAYDEN Claudia [phone]       [facility] [address] [facility] [address]        Equal Access to Services     [facility]: Hadii aad ku hadasho Soomaali, waaxda luqadaha, qaybta kaalmada adeegyada, jose alejandro bolanos hayidalian jessica reynaga . So [facility] [phone].    ATENCIÓN: Si habla español, tiene a bear disposición servicios gratuitos de asistencia lingüística. Ap al [phone].    We comply with applicable federal civil rights laws and [place] laws. We do not discriminate on the basis of race, color, national origin, age, disability, sex, sexual orientation, or gender identity.            Thank you!     Thank you for choosing [facility]  for your care. Our goal is always to provide you with excellent care. Hearing back from our patients is one way we can continue to improve our services. Please take a few minutes to complete the written survey  that you may receive in the mail after your visit with us. Thank you!             Your Updated Medication List - Protect others around you: Learn how to safely use, store and throw away your medicines at www.disposemymeds.org.          This list is accurate as of 10/9/18  7:50 PM.  Always use your most recent med list.                   Brand Name Dispense Instructions for use Diagnosis    estradiol 1 MG tablet    ESTRACE    75 tablet    Take 2.5 tablets (2.5 mg) by mouth daily        norethindrone 0.35 MG per tablet    MICRONOR    84 tablet    Take 1 tablet (0.35 mg) by mouth daily    Endometriosis

## 2018-10-09 NOTE — NURSING NOTE
"Patient presents today as a follow-up on being on Micronor. She states that she is \"going crazy\" with being on the Micronor. She is still having lower abdominal pain and sexual intercourse is painful.  Rosio Perea LPN  10/9/2018  12:54 PM             "

## 2018-10-10 ASSESSMENT — ANXIETY QUESTIONNAIRES: GAD7 TOTAL SCORE: 18

## 2018-10-10 ASSESSMENT — PATIENT HEALTH QUESTIONNAIRE - PHQ9: SUM OF ALL RESPONSES TO PHQ QUESTIONS 1-9: 14

## 2018-10-10 NOTE — PROGRESS NOTES
Marlee comes back for follow-up.  She has been taking 2-1/2 mg of the Estrace daily.  She has not tolerated well norethindrone.  No bleeding but does get a lot of cramping.  She is quite tearful today.  She is very tired of the pelvic pain.  Again has a history of stage IV endometriosis with significant adhesions.  Had emergency surgery which resulted in laparotomy and a BSO for large endometriomas.  Uterus remains.  The time of her surgery she was told that because of colon involvement this would be a very difficult surgery.  Does not have any bowel related symptoms however  Not interested in the progesterone IUD    Marlee and I discussed the situation at length.  She is gotten to the point where she would like to consider hysterectomy.  We reviewed the fact that she probably has extensive adhesions and that depending on the degree of involvement of the colon may or may not need surgery to the bowel.  At this point I think it would be appropriate to consider an open diagnostic laparoscopy to assess the pelvis to see if she is a candidate for a laparoscopic hysterectomy, and open hysterectomy or potentially even a referral to a larger center where they could be prepared for a more extensive surgery.  Patient is very much in agreement with this.  We discussed a diagnostic laparoscopy and discussed that if there were simple adhesions or issues that could be remedied at the time they would be done but purpose of the surgery was largely to assess the situation take pictures and allow us to have further discussion of what would be the most appropriate for her to proceed.    30 minutes spent face-to-face basically all of it in counseling time

## 2018-10-29 ENCOUNTER — TELEPHONE (OUTPATIENT)
Dept: FAMILY MEDICINE | Facility: OTHER | Age: 38
End: 2018-10-29

## 2018-10-29 ENCOUNTER — HOSPITAL ENCOUNTER (OUTPATIENT)
Dept: GENERAL RADIOLOGY | Facility: OTHER | Age: 38
Discharge: HOME | End: 2018-10-29
Attending: PHYSICIAN ASSISTANT | Admitting: PHYSICIAN ASSISTANT
Payer: COMMERCIAL

## 2018-10-29 ENCOUNTER — OFFICE VISIT (OUTPATIENT)
Dept: FAMILY MEDICINE | Facility: OTHER | Age: 38
End: 2018-10-29
Attending: PHYSICIAN ASSISTANT
Payer: COMMERCIAL

## 2018-10-29 VITALS
DIASTOLIC BLOOD PRESSURE: 70 MMHG | RESPIRATION RATE: 18 BRPM | TEMPERATURE: 98 F | HEART RATE: 71 BPM | BODY MASS INDEX: 27.55 KG/M2 | OXYGEN SATURATION: 95 % | WEIGHT: 186 LBS | SYSTOLIC BLOOD PRESSURE: 110 MMHG | HEIGHT: 69 IN

## 2018-10-29 DIAGNOSIS — Z01.818 PRE-OP EXAM: Primary | ICD-10-CM

## 2018-10-29 DIAGNOSIS — Z23 NEEDS FLU SHOT: ICD-10-CM

## 2018-10-29 DIAGNOSIS — M25.562 ACUTE PAIN OF LEFT KNEE: ICD-10-CM

## 2018-10-29 DIAGNOSIS — Z23 NEED FOR PROPHYLACTIC VACCINATION AND INOCULATION AGAINST INFLUENZA: ICD-10-CM

## 2018-10-29 LAB — HGB BLD-MCNC: 14 G/DL (ref 11.7–15.7)

## 2018-10-29 PROCEDURE — 90471 IMMUNIZATION ADMIN: CPT | Performed by: PHYSICIAN ASSISTANT

## 2018-10-29 PROCEDURE — 99214 OFFICE O/P EST MOD 30 MIN: CPT | Performed by: PHYSICIAN ASSISTANT

## 2018-10-29 PROCEDURE — G0463 HOSPITAL OUTPT CLINIC VISIT: HCPCS

## 2018-10-29 PROCEDURE — 85018 HEMOGLOBIN: CPT | Performed by: PHYSICIAN ASSISTANT

## 2018-10-29 PROCEDURE — 73560 X-RAY EXAM OF KNEE 1 OR 2: CPT | Mod: RT

## 2018-10-29 PROCEDURE — 90686 IIV4 VACC NO PRSV 0.5 ML IM: CPT | Performed by: PHYSICIAN ASSISTANT

## 2018-10-29 PROCEDURE — G0463 HOSPITAL OUTPT CLINIC VISIT: HCPCS | Mod: 25

## 2018-10-29 PROCEDURE — 36415 COLL VENOUS BLD VENIPUNCTURE: CPT | Performed by: PHYSICIAN ASSISTANT

## 2018-10-29 ASSESSMENT — ANXIETY QUESTIONNAIRES
2. NOT BEING ABLE TO STOP OR CONTROL WORRYING: NEARLY EVERY DAY
6. BECOMING EASILY ANNOYED OR IRRITABLE: NEARLY EVERY DAY
GAD7 TOTAL SCORE: 16
5. BEING SO RESTLESS THAT IT IS HARD TO SIT STILL: SEVERAL DAYS
7. FEELING AFRAID AS IF SOMETHING AWFUL MIGHT HAPPEN: NOT AT ALL
3. WORRYING TOO MUCH ABOUT DIFFERENT THINGS: NEARLY EVERY DAY
1. FEELING NERVOUS, ANXIOUS, OR ON EDGE: NEARLY EVERY DAY

## 2018-10-29 ASSESSMENT — PATIENT HEALTH QUESTIONNAIRE - PHQ9
5. POOR APPETITE OR OVEREATING: NEARLY EVERY DAY
SUM OF ALL RESPONSES TO PHQ QUESTIONS 1-9: 16

## 2018-10-29 NOTE — NURSING NOTE
"Date of Surgery: 11/8/18  Type of Surgery: Diagnostic Laparoscopy & Lysis of Adhesions  Surgeon: Dr. Chuck Flor  [facility]:  [facility]  Fax:     Fever/Chills or other infectious symptoms in past month: NO  >10lb weight loss in past two months: NO  O2 SAT: 95    Health Care Directive/Code status:  NO  Hx of blood transfusions:   NO   Td up to date:  2/1/98--HAS ALLERGY  History of VRE/MRSA:  NO Date:    Preoperative Evaluation: Obstructive Sleep Apnea screening    S: Snore -  Do you snore loudly? (louder than talking or loud enough to be heard through closed doors)NO  T: Tired - Do you often feel tired, fatigued, or sleepy during the daytime?YES  O: Observed - Has anyone ever observed you stop breathing during your sleep?NO  P: Pressure - Do you have or are you being treated for high blood pressure?NO  B: BMI - BMI greater than 35kg/m2?NO  A: Age - Age over 50 years old?NO  N: Neck - Neck circumference greater than 40 cm?NO  G: Gender - Gender: Male?NO    Total number of \"YES\" responses:  1    Scoring: Low risk of SANTANA 0-2  At Risk of SANTANA: >3 High Risk of SANTANA: 5-8    Christen Don LPN ...... 10/29/2018 11:23 AM      "
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
## 2018-10-29 NOTE — MR AVS SNAPSHOT
After Visit Summary   10/29/2018    Marlee Wise    MRN: [id number]           Patient Information     Date Of Birth          1980        Visit Information        Provider Department      10/29/2018 11:15 AM Sameera Vu PA-C [facility]'s Diagnoses     Pre-op exam    -  1    Acute pain of left knee          Care Instructions    Patient should take the following medications the morning of surgery with a small sip of water: hold all meds.  Patient was instructed to hold the following medications the morning of surgery: hold all meds.     Patient was advised to call our office and the surgical services with any change in condition or new symptoms if they were to develop between today and their surgical date.  Especially any cardiopulmonary symptoms or symptoms concerning for an infection.     Discontinue aspirin, aleve, naproxen and ibuprofen 7 days prior to surgery to reduce bleeding risk.  It is fine to take tylenol the week before surgery.  Hold vitamins and herbal remedies for 7 days before surgery.            Follow-ups after your visit        Additional Services     ORTHOPEDICS ADULT REFERRAL       Your provider has referred you to: [place]: [facility] [phone] [email or web address]    Please be aware that coverage of these services is subject to the terms and limitations of your health insurance plan.  Call member services at your health plan with any benefit or coverage questions.      Please bring the following to your appointment:    >>   Any x-rays, CTs or MRIs which have been performed.  Contact the facility where they were done to arrange for  prior to your scheduled appointment.    >>   List of current medications   >>   This referral request   >>   Any documents/labs given to you for this referral                  Follow-up notes from your care team     Return if symptoms worsen or fail to  improve.      Your next 10 appointments already scheduled     Nov 15, 2018  3:00 PM CST   SHORT with Chuck Flor MD   [facility] ([facility])    [address]  [place] [id number] [phone]              Future tests that were ordered for you today     Open Future Orders        Priority Expected Expires Ordered    XR Knee Standing AP Bilat [place] Bilat Lat Left Routine 10/29/2018 10/29/2019 10/29/2018    Hemoglobin Routine  10/29/2019 10/29/2018            Who to contact     If you have questions or need follow up information about today's clinic visit or your schedule please contact [facility] directly at [phone].  Normal or non-critical lab and imaging results will be communicated to you by Edxacthart, letter or phone within 4 business days after the clinic has received the results. If you do not hear from us within 7 days, please contact the clinic through Friend.lyt or phone. If you have a critical or abnormal lab result, we will notify you by phone as soon as possible.  Submit refill requests through Trustlook or call your pharmacy and they will forward the refill request to us. Please allow 3 business days for your refill to be completed.          Additional Information About Your Visit        Trustlook Information     Trustlook gives you secure access to your electronic health record. If you see a primary care provider, you can also send messages to your care team and make appointments. If you have questions, please call your primary care clinic.  If you do not have a primary care provider, please call [phone] and they will assist you.        Care EveryWhere ID     This is your Care EveryWhere ID. This could be used by other organizations to access your [place] medical records  [id number]        Your Vitals Were     Pulse Temperature Respirations Height Pulse Oximetry BMI (Body Mass Index)    71 98  F (36.7  C) 18 5'  "8.5\" (1.74 m) 95% 27.87 kg/m2       Blood Pressure from Last 3 Encounters:   10/29/18 110/70   10/09/18 118/76   08/20/18 110/60    Weight from Last 3 Encounters:   10/29/18 186 lb (84.4 kg)              We Performed the Following     ORTHOPEDICS ADULT REFERRAL          Today's Medication Changes          These changes are accurate as of 10/29/18 11:47 AM.  If you have any questions, ask your nurse or doctor.               Stop taking these medicines if you haven't already. Please contact your care team if you have questions.     norethindrone 0.35 MG per tablet   Commonly known as:  MICRONOR   Stopped by:  Sameera Vu PA-C                    Primary Care Provider Office Phone # Fax #    Meseret MAYLIN Batista [phone]       [facility] [address]  [facility] [address]        Equal Access to Services     [facility]: Hadii rachel ku hadasho Soomaali, waaxda luqadaha, qaybta kaalmada adeegyada, waxay marcusin hayloreta reynaga . So [facility] [phone].    ATENCIÓN: Si dionisio martin, tiene a bear disposición servicios gratuitos de asistencia lingüística. Llame al [phone].    We comply with applicable federal civil rights laws and [place] laws. We do not discriminate on the basis of race, color, national origin, age, disability, sex, sexual orientation, or gender identity.            Thank you!     Thank you for choosing [facility]  for your care. Our goal is always to provide you with excellent care. Hearing back from our patients is one way we can continue to improve our services. Please take a few minutes to complete the written survey that you may receive in the mail after your visit with us. Thank you!             Your Updated Medication List - Protect others around you: Learn how to safely use, store and throw away your medicines at www.disposemymeds.org.          This list is accurate as of 10/29/18 11:47 AM.  Always " use your most recent med list.                   Brand Name Dispense Instructions for use Diagnosis    estradiol 1 MG tablet    ESTRACE    75 tablet    Take 2.5 tablets (2.5 mg) by mouth daily

## 2018-10-29 NOTE — TELEPHONE ENCOUNTER
No acute concerns were appreciated on the initial read of the left knee xray. Final read by radiology is pending.   I placed an order to have a left knee MRI completed to rule out concerns prior to your ortho appointment.   Sameera Vu PA-C..................10/29/2018 12:36 PM

## 2018-10-29 NOTE — PROGRESS NOTES

## 2018-10-30 ASSESSMENT — ANXIETY QUESTIONNAIRES: GAD7 TOTAL SCORE: 16

## 2018-11-01 ENCOUNTER — TRANSFERRED RECORDS (OUTPATIENT)
Dept: HEALTH INFORMATION MANAGEMENT | Facility: CLINIC | Age: 38
End: 2018-11-01

## 2018-11-01 ENCOUNTER — HOSPITAL ENCOUNTER (OUTPATIENT)
Dept: MRI IMAGING | Facility: OTHER | Age: 38
Discharge: HOME | End: 2018-11-01
Attending: PHYSICIAN ASSISTANT | Admitting: PHYSICIAN ASSISTANT
Payer: COMMERCIAL

## 2018-11-01 DIAGNOSIS — M25.562 ACUTE PAIN OF LEFT KNEE: ICD-10-CM

## 2018-11-01 PROCEDURE — 73721 MRI JNT OF LWR EXTRE W/O DYE: CPT | Mod: LT

## 2018-11-04 DIAGNOSIS — N80.9 ENDOMETRIOSIS: Primary | ICD-10-CM

## 2018-11-05 ENCOUNTER — TRANSFERRED RECORDS (OUTPATIENT)
Dept: HEALTH INFORMATION MANAGEMENT | Facility: OTHER | Age: 38
End: 2018-11-05

## 2018-11-07 ENCOUNTER — ANESTHESIA EVENT (OUTPATIENT)
Dept: SURGERY | Facility: OTHER | Age: 38
End: 2018-11-07
Payer: COMMERCIAL

## 2018-11-07 NOTE — TELEPHONE ENCOUNTER
"Refill request from   For:  estradiol (ESTRACE) 1 MG tablet    LOV 10/29/2018 for pre-op    LOV with OB/GYN 10/9/2018-Dr. Chuck Flor    Discussed hysterectomy \"At this point I think it would be appropriate to consider an open diagnostic laparoscopy to assess the pelvis to see if she is a candidate for a laparoscopic hysterectomy, and open hysterectomy or potentially even a referral to a larger center where they could be prepared for a more extensive surgery.\"    Patient scheduled for diagnostic laparoscopy & lysis of adhesions tomorrow 11/8/2018.    Will route to Dr. Chuck Flor for consideration as medication may/may not be appropriate pending results of procedure.     Requested Prescriptions   Pending Prescriptions Disp Refills     estradiol (ESTRACE) 1 MG tablet [Pharmacy Med Name: ESTRADIOL 1MG TABLETS] 75 tablet 0     Sig: TAKE 2 AND 1/2 TABLETS(2.5 MG) BY MOUTH DAILY    Hormone Replacement Therapy Passed    11/4/2018 11:40 AM       Passed - Blood pressure under 140/90 in past 12 months    BP Readings from Last 3 Encounters:   10/29/18 110/70   10/09/18 118/76   08/20/18 110/60          Passed - Recent (12 mo) or future (30 days) visit within the authorizing provider's specialty    Patient had office visit in the last 12 months or has a visit in the next 30 days with authorizing provider or within the authorizing provider's specialty.  See \"Patient Info\" tab in inbasket, or \"Choose Columns\" in Meds & Orders section of the refill encounter.             Passed - Patient is 18 years of age or older       Passed - No active pregnancy on record       Passed - No positive pregnancy test on record in past 12 months        Unable to complete prescription refill per RN Medication Refill Policy.................... Joanna Lee ....................  11/7/2018   4:09 PM        "

## 2018-11-08 ENCOUNTER — HOSPITAL ENCOUNTER (OUTPATIENT)
Facility: OTHER | Age: 38
Discharge: HOME | End: 2018-11-08
Attending: OBSTETRICS & GYNECOLOGY | Admitting: OBSTETRICS & GYNECOLOGY
Payer: COMMERCIAL

## 2018-11-08 ENCOUNTER — ANESTHESIA (OUTPATIENT)
Dept: SURGERY | Facility: OTHER | Age: 38
End: 2018-11-08
Payer: COMMERCIAL

## 2018-11-08 ENCOUNTER — SURGERY (OUTPATIENT)
Age: 38
End: 2018-11-08

## 2018-11-08 VITALS
HEART RATE: 74 BPM | SYSTOLIC BLOOD PRESSURE: 109 MMHG | RESPIRATION RATE: 11 BRPM | BODY MASS INDEX: 27.87 KG/M2 | WEIGHT: 186 LBS | OXYGEN SATURATION: 95 % | DIASTOLIC BLOOD PRESSURE: 83 MMHG | TEMPERATURE: 97.4 F

## 2018-11-08 DIAGNOSIS — Z98.890 S/P EXPLORATORY LAPAROTOMY: Primary | ICD-10-CM

## 2018-11-08 DIAGNOSIS — N80.9 ENDOMETRIOSIS: ICD-10-CM

## 2018-11-08 PROCEDURE — 37000008 ZZH ANESTHESIA TECHNICAL FEE, 1ST 30 MIN: Performed by: OBSTETRICS & GYNECOLOGY

## 2018-11-08 PROCEDURE — 25000125 ZZHC RX 250: Performed by: NURSE ANESTHETIST, CERTIFIED REGISTERED

## 2018-11-08 PROCEDURE — 36000056 ZZH SURGERY LEVEL 3 1ST 30 MIN: Performed by: OBSTETRICS & GYNECOLOGY

## 2018-11-08 PROCEDURE — 25000128 H RX IP 250 OP 636: Performed by: OBSTETRICS & GYNECOLOGY

## 2018-11-08 PROCEDURE — 40000306 ZZH STATISTIC PRE PROC ASSESS II: Performed by: OBSTETRICS & GYNECOLOGY

## 2018-11-08 PROCEDURE — 25000128 H RX IP 250 OP 636: Performed by: NURSE ANESTHETIST, CERTIFIED REGISTERED

## 2018-11-08 PROCEDURE — 37000009 ZZH ANESTHESIA TECHNICAL FEE, EACH ADDTL 15 MIN: Performed by: OBSTETRICS & GYNECOLOGY

## 2018-11-08 PROCEDURE — 71000014 ZZH RECOVERY PHASE 1 LEVEL 2 FIRST HR: Performed by: OBSTETRICS & GYNECOLOGY

## 2018-11-08 PROCEDURE — 27210794 ZZH OR GENERAL SUPPLY STERILE: Performed by: OBSTETRICS & GYNECOLOGY

## 2018-11-08 PROCEDURE — 25000132 ZZH RX MED GY IP 250 OP 250 PS 637: Performed by: OBSTETRICS & GYNECOLOGY

## 2018-11-08 PROCEDURE — 49329 UNLSTD LAPS PX ABD PERTM&OMN: CPT | Performed by: OBSTETRICS & GYNECOLOGY

## 2018-11-08 PROCEDURE — 49320 DIAG LAPARO SEPARATE PROC: CPT | Performed by: NURSE ANESTHETIST, CERTIFIED REGISTERED

## 2018-11-08 PROCEDURE — 36000058 ZZH SURGERY LEVEL 3 EA 15 ADDTL MIN: Performed by: OBSTETRICS & GYNECOLOGY

## 2018-11-08 PROCEDURE — 71000027 ZZH RECOVERY PHASE 2 EACH 15 MINS: Performed by: OBSTETRICS & GYNECOLOGY

## 2018-11-08 RX ORDER — IBUPROFEN 600 MG/1
600 TABLET, FILM COATED ORAL EVERY 6 HOURS PRN
Qty: 30 TABLET | Refills: 1 | Status: SHIPPED | OUTPATIENT
Start: 2018-11-08 | End: 2019-02-08

## 2018-11-08 RX ORDER — PROPOFOL 10 MG/ML
INJECTION, EMULSION INTRAVENOUS PRN
Status: DISCONTINUED | OUTPATIENT
Start: 2018-11-08 | End: 2018-11-08

## 2018-11-08 RX ORDER — OXYCODONE HYDROCHLORIDE 5 MG/1
5-10 TABLET ORAL EVERY 4 HOURS PRN
Qty: 10 TABLET | Refills: 0 | Status: SHIPPED | OUTPATIENT
Start: 2018-11-08 | End: 2019-02-08

## 2018-11-08 RX ORDER — MEPERIDINE HYDROCHLORIDE 50 MG/ML
12.5 INJECTION INTRAMUSCULAR; INTRAVENOUS; SUBCUTANEOUS
Status: DISCONTINUED | OUTPATIENT
Start: 2018-11-08 | End: 2018-11-08 | Stop reason: HOSPADM

## 2018-11-08 RX ORDER — CEFAZOLIN SODIUM 1 G/3ML
1 INJECTION, POWDER, FOR SOLUTION INTRAMUSCULAR; INTRAVENOUS SEE ADMIN INSTRUCTIONS
Status: DISCONTINUED | OUTPATIENT
Start: 2018-11-08 | End: 2018-11-08 | Stop reason: HOSPADM

## 2018-11-08 RX ORDER — OXYCODONE AND ACETAMINOPHEN 10; 325 MG/1; MG/1
1 TABLET ORAL EVERY 6 HOURS PRN
Qty: 12 TABLET | Refills: 0 | Status: SHIPPED | OUTPATIENT
Start: 2018-11-08 | End: 2019-02-08

## 2018-11-08 RX ORDER — LIDOCAINE 40 MG/G
CREAM TOPICAL
Status: DISCONTINUED | OUTPATIENT
Start: 2018-11-08 | End: 2018-11-08 | Stop reason: HOSPADM

## 2018-11-08 RX ORDER — SODIUM CHLORIDE, SODIUM LACTATE, POTASSIUM CHLORIDE, CALCIUM CHLORIDE 600; 310; 30; 20 MG/100ML; MG/100ML; MG/100ML; MG/100ML
INJECTION, SOLUTION INTRAVENOUS CONTINUOUS
Status: DISCONTINUED | OUTPATIENT
Start: 2018-11-08 | End: 2018-11-08 | Stop reason: HOSPADM

## 2018-11-08 RX ORDER — DEXAMETHASONE SODIUM PHOSPHATE 4 MG/ML
INJECTION, SOLUTION INTRA-ARTICULAR; INTRALESIONAL; INTRAMUSCULAR; INTRAVENOUS; SOFT TISSUE PRN
Status: DISCONTINUED | OUTPATIENT
Start: 2018-11-08 | End: 2018-11-08

## 2018-11-08 RX ORDER — ONDANSETRON 4 MG/1
4 TABLET, ORALLY DISINTEGRATING ORAL
Status: CANCELLED | OUTPATIENT
Start: 2018-11-08

## 2018-11-08 RX ORDER — IBUPROFEN 200 MG
600 TABLET ORAL
Status: CANCELLED | OUTPATIENT
Start: 2018-11-08

## 2018-11-08 RX ORDER — KETOROLAC TROMETHAMINE 30 MG/ML
30 INJECTION, SOLUTION INTRAMUSCULAR; INTRAVENOUS ONCE
Status: COMPLETED | OUTPATIENT
Start: 2018-11-08 | End: 2018-11-08

## 2018-11-08 RX ORDER — HYDROCODONE BITARTRATE AND ACETAMINOPHEN 5; 325 MG/1; MG/1
1 TABLET ORAL
Status: CANCELLED | OUTPATIENT
Start: 2018-11-08

## 2018-11-08 RX ORDER — NEOSTIGMINE METHYLSULFATE 1 MG/ML
VIAL (ML) INJECTION PRN
Status: DISCONTINUED | OUTPATIENT
Start: 2018-11-08 | End: 2018-11-08

## 2018-11-08 RX ORDER — PROPOFOL 10 MG/ML
INJECTION, EMULSION INTRAVENOUS CONTINUOUS PRN
Status: DISCONTINUED | OUTPATIENT
Start: 2018-11-08 | End: 2018-11-08

## 2018-11-08 RX ORDER — BUPIVACAINE HYDROCHLORIDE 2.5 MG/ML
INJECTION, SOLUTION INFILTRATION; PERINEURAL PRN
Status: DISCONTINUED | OUTPATIENT
Start: 2018-11-08 | End: 2018-11-08 | Stop reason: HOSPADM

## 2018-11-08 RX ORDER — KETAMINE HYDROCHLORIDE 50 MG/ML
INJECTION, SOLUTION INTRAMUSCULAR; INTRAVENOUS PRN
Status: DISCONTINUED | OUTPATIENT
Start: 2018-11-08 | End: 2018-11-08

## 2018-11-08 RX ORDER — NALOXONE HYDROCHLORIDE 0.4 MG/ML
.1-.4 INJECTION, SOLUTION INTRAMUSCULAR; INTRAVENOUS; SUBCUTANEOUS
Status: DISCONTINUED | OUTPATIENT
Start: 2018-11-08 | End: 2018-11-08 | Stop reason: HOSPADM

## 2018-11-08 RX ORDER — FENTANYL CITRATE 50 UG/ML
25-50 INJECTION, SOLUTION INTRAMUSCULAR; INTRAVENOUS
Status: DISCONTINUED | OUTPATIENT
Start: 2018-11-08 | End: 2018-11-08 | Stop reason: HOSPADM

## 2018-11-08 RX ORDER — OXYCODONE HYDROCHLORIDE 5 MG/1
5 TABLET ORAL EVERY 4 HOURS PRN
Status: DISCONTINUED | OUTPATIENT
Start: 2018-11-08 | End: 2018-11-08

## 2018-11-08 RX ORDER — ACETAMINOPHEN 325 MG/1
975 TABLET ORAL ONCE
Status: COMPLETED | OUTPATIENT
Start: 2018-11-08 | End: 2018-11-08

## 2018-11-08 RX ORDER — ONDANSETRON 2 MG/ML
4 INJECTION INTRAMUSCULAR; INTRAVENOUS EVERY 30 MIN PRN
Status: DISCONTINUED | OUTPATIENT
Start: 2018-11-08 | End: 2018-11-08 | Stop reason: HOSPADM

## 2018-11-08 RX ORDER — LIDOCAINE HYDROCHLORIDE 20 MG/ML
INJECTION, SOLUTION INFILTRATION; PERINEURAL PRN
Status: DISCONTINUED | OUTPATIENT
Start: 2018-11-08 | End: 2018-11-08

## 2018-11-08 RX ORDER — ONDANSETRON 4 MG/1
4 TABLET, ORALLY DISINTEGRATING ORAL EVERY 30 MIN PRN
Status: DISCONTINUED | OUTPATIENT
Start: 2018-11-08 | End: 2018-11-08 | Stop reason: HOSPADM

## 2018-11-08 RX ORDER — OXYCODONE HYDROCHLORIDE 5 MG/1
5-10 TABLET ORAL EVERY 4 HOURS PRN
Status: DISCONTINUED | OUTPATIENT
Start: 2018-11-08 | End: 2018-11-08 | Stop reason: HOSPADM

## 2018-11-08 RX ORDER — FENTANYL CITRATE 50 UG/ML
INJECTION, SOLUTION INTRAMUSCULAR; INTRAVENOUS PRN
Status: DISCONTINUED | OUTPATIENT
Start: 2018-11-08 | End: 2018-11-08

## 2018-11-08 RX ORDER — ESTRADIOL 1 MG/1
TABLET ORAL
Qty: 80 TABLET | Refills: 3 | Status: SHIPPED | OUTPATIENT
Start: 2018-11-08 | End: 2019-07-26

## 2018-11-08 RX ORDER — GLYCOPYRROLATE 0.2 MG/ML
INJECTION, SOLUTION INTRAMUSCULAR; INTRAVENOUS PRN
Status: DISCONTINUED | OUTPATIENT
Start: 2018-11-08 | End: 2018-11-08

## 2018-11-08 RX ORDER — ONDANSETRON 2 MG/ML
INJECTION INTRAMUSCULAR; INTRAVENOUS PRN
Status: DISCONTINUED | OUTPATIENT
Start: 2018-11-08 | End: 2018-11-08

## 2018-11-08 RX ORDER — CEFAZOLIN SODIUM 2 G/100ML
2 INJECTION, SOLUTION INTRAVENOUS
Status: COMPLETED | OUTPATIENT
Start: 2018-11-08 | End: 2018-11-08

## 2018-11-08 RX ADMIN — PROPOFOL 200 MG: 10 INJECTION, EMULSION INTRAVENOUS at 09:10

## 2018-11-08 RX ADMIN — FENTANYL CITRATE 50 MCG: 50 INJECTION, SOLUTION INTRAMUSCULAR; INTRAVENOUS at 10:23

## 2018-11-08 RX ADMIN — KETOROLAC TROMETHAMINE 30 MG: 30 INJECTION, SOLUTION INTRAMUSCULAR at 08:35

## 2018-11-08 RX ADMIN — FENTANYL CITRATE 50 MCG: 50 INJECTION, SOLUTION INTRAMUSCULAR; INTRAVENOUS at 09:29

## 2018-11-08 RX ADMIN — MIDAZOLAM 2 MG: 1 INJECTION INTRAMUSCULAR; INTRAVENOUS at 09:06

## 2018-11-08 RX ADMIN — NEOSTIGMINE METHYLSULFATE 4 MG: 1 INJECTION INTRAVENOUS at 10:01

## 2018-11-08 RX ADMIN — GLYCOPYRROLATE 0.6 MG: 0.2 INJECTION, SOLUTION INTRAMUSCULAR; INTRAVENOUS at 10:01

## 2018-11-08 RX ADMIN — KETAMINE HYDROCHLORIDE 30 MG: 50 INJECTION, SOLUTION INTRAMUSCULAR; INTRAVENOUS at 09:25

## 2018-11-08 RX ADMIN — FENTANYL CITRATE 50 MCG: 50 INJECTION, SOLUTION INTRAMUSCULAR; INTRAVENOUS at 10:55

## 2018-11-08 RX ADMIN — LIDOCAINE HYDROCHLORIDE 80 MG: 20 INJECTION, SOLUTION INFILTRATION; PERINEURAL at 09:10

## 2018-11-08 RX ADMIN — FENTANYL CITRATE 50 MCG: 50 INJECTION, SOLUTION INTRAMUSCULAR; INTRAVENOUS at 10:07

## 2018-11-08 RX ADMIN — PROPOFOL 200 MCG/KG/MIN: 10 INJECTION, EMULSION INTRAVENOUS at 09:11

## 2018-11-08 RX ADMIN — OXYCODONE HYDROCHLORIDE 5 MG: 5 TABLET ORAL at 11:43

## 2018-11-08 RX ADMIN — CEFAZOLIN SODIUM 2 G: 2 INJECTION, SOLUTION INTRAVENOUS at 09:07

## 2018-11-08 RX ADMIN — FENTANYL CITRATE 50 MCG: 50 INJECTION, SOLUTION INTRAMUSCULAR; INTRAVENOUS at 10:36

## 2018-11-08 RX ADMIN — BUPIVACAINE HYDROCHLORIDE 9 ML: 2.5 INJECTION, SOLUTION INFILTRATION; PERINEURAL at 09:54

## 2018-11-08 RX ADMIN — OXYCODONE HYDROCHLORIDE 5 MG: 5 TABLET ORAL at 11:31

## 2018-11-08 RX ADMIN — ROCURONIUM BROMIDE 10 MG: 10 INJECTION INTRAVENOUS at 09:47

## 2018-11-08 RX ADMIN — ONDANSETRON 4 MG: 2 INJECTION INTRAMUSCULAR; INTRAVENOUS at 09:10

## 2018-11-08 RX ADMIN — FENTANYL CITRATE 50 MCG: 50 INJECTION, SOLUTION INTRAMUSCULAR; INTRAVENOUS at 09:55

## 2018-11-08 RX ADMIN — SODIUM CHLORIDE, POTASSIUM CHLORIDE, SODIUM LACTATE AND CALCIUM CHLORIDE: 600; 310; 30; 20 INJECTION, SOLUTION INTRAVENOUS at 09:03

## 2018-11-08 RX ADMIN — ACETAMINOPHEN 975 MG: 325 TABLET, FILM COATED ORAL at 08:12

## 2018-11-08 RX ADMIN — ROCURONIUM BROMIDE 40 MG: 10 INJECTION INTRAVENOUS at 09:10

## 2018-11-08 RX ADMIN — SODIUM CHLORIDE, SODIUM LACTATE, POTASSIUM CHLORIDE, AND CALCIUM CHLORIDE: 600; 310; 30; 20 INJECTION, SOLUTION INTRAVENOUS at 10:41

## 2018-11-08 RX ADMIN — DEXAMETHASONE SODIUM PHOSPHATE 8 MG: 4 INJECTION, SOLUTION INTRA-ARTICULAR; INTRALESIONAL; INTRAMUSCULAR; INTRAVENOUS; SOFT TISSUE at 09:23

## 2018-11-08 ASSESSMENT — LIFESTYLE VARIABLES: TOBACCO_USE: 1

## 2018-11-08 NOTE — ANESTHESIA POSTPROCEDURE EVALUATION
Patient: Marlee Wise    Procedure(s):  Diagnostic Laparoscopy & Lysis of Adhesions    Diagnosis:stage IV Endometriosis  Diagnosis Additional Information: No value filed.    Anesthesia Type:  General, ETT    Note:  Anesthesia Post Evaluation    Patient location during evaluation: Phase 2  Patient participation: Able to fully participate in evaluation  Level of consciousness: awake and alert  Pain management: adequate  Airway patency: patent  Cardiovascular status: acceptable  Respiratory status: acceptable  Hydration status: acceptable  PONV: none             Last vitals:  Vitals:    11/08/18 1055 11/08/18 1100 11/08/18 1105   BP: 115/71 106/75 109/83   Pulse:      Resp: 11 13 11   Temp:  97.2  F (36.2  C) 97.4  F (36.3  C)   SpO2: 96% 96% 95%         Electronically Signed By: MAYLIN CORCORAN CRNA  November 8, 2018  11:21 AM

## 2018-11-08 NOTE — OR NURSING
PACU Transfer Note    Marlee Wise was transferred to 2 via cart.  Equipment used for transport:  none.  Accompanied by:  Ritu Yip RN  Hand off report given to Michael Cruz RN    PACU Respiratory Event Documentation     1) Episodes of Apnea greater than or equal to 10 seconds: no    2) Bradypnea - less than 8 breaths per minute: no    3) Pain score on 0 to 10 scale: 2    4) Pain-sedation mismatch (yes or no): no    5) Repeated 02 desaturation less than 90% (yes or no): no    Anesthesia notified? (yes or no): no    Any of the above events occuring repeatedly in separate 30 minute intervals may be considered recurrent PACU respiratory events.    Patient stable and meets phase 1 discharge criteria for transport from PACU.

## 2018-11-08 NOTE — OR NURSING
Discharged ambulatory with friend Brinda. New Rx faxed to [facility]. States pain decreased and tolerable.

## 2018-11-08 NOTE — INTERVAL H&P NOTE
The History and Physical is reviewed from 10/09/2018.  No changes or additions.  Chest: CTA  CV: RRR  Surgery reviewed in detail.  Understands how we will proceed.  All questions answered

## 2018-11-08 NOTE — ANESTHESIA PREPROCEDURE EVALUATION
Anesthesia Evaluation     . Pt has had prior anesthetic.     No history of anesthetic complications          ROS/MED HX    ENT/Pulmonary:     (+)tobacco use, 0.5 packs/day  , . .    Neurologic:  - neg neurologic ROS     Cardiovascular:  - neg cardiovascular ROS       METS/Exercise Tolerance:  >4 METS   Hematologic:  - neg hematologic  ROS       Musculoskeletal:  - neg musculoskeletal ROS       GI/Hepatic:  - neg GI/hepatic ROS       Renal/Genitourinary:  - ROS Renal section negative       Endo:  - neg endo ROS       Psychiatric:  - neg psychiatric ROS       Infectious Disease:  - neg infectious disease ROS       Malignancy:      - no malignancy   Other:    (+) No chance of pregnancy C-spine cleared: N/A, no H/O Chronic Pain,no other significant disability   - neg other ROS                 Physical Exam  Normal systems: cardiovascular, pulmonary and dental    Airway   Mallampati: II  Neck ROM: full    Dental     Cardiovascular   Rhythm and rate: regular and normal      Pulmonary    breath sounds clear to auscultation                    Anesthesia Plan      History & Physical Review      ASA Status:  2 .    NPO Status:  > 6 hours    Plan for General and ETT with Intravenous induction. Maintenance will be Balanced.    PONV prophylaxis:  Ondansetron (or other 5HT-3) and Dexamethasone or Solumedrol       Postoperative Care  Postoperative pain management:  IV analgesics and Multi-modal analgesia.      Consents  Anesthetic plan, risks, benefits and alternatives discussed with:  Patient and Spouse.  Use of blood products discussed: No .   .                          .

## 2018-11-08 NOTE — BRIEF OP NOTE
[facility] Brief Operative Note    Pre-operative diagnosis: stage IV Endometriosis   Post-operative diagnosis Pelvic pain,Pelvic adhesions, History of Stave IV endometriosis     Procedure: Procedure(s):  Diagnostic Laparoscopy & Lysis of Adhesions   Surgeon(s): Surgeon(s) and Role:     * Chuck Flor MD - Primary   Estimated blood loss: 8 mL    Specimens: * No specimens in log *   Findings: Dense band of adhesions to abdominal wall, colon adhesed to posterior uterus, broad ligaments are free.  Would probably be amenable to a laparoscopic hysterectomy if required

## 2018-11-08 NOTE — IP AVS SNAPSHOT
[facility]    [address]    [place] [address]    Phone:  [phone]    Fax:  [phone]                                       After Visit Summary   11/8/2018    Marlee Wise    MRN: [id number]           After Visit Summary Signature Page     I have received my discharge instructions, and my questions have been answered. I have discussed any challenges I see with this plan with the nurse or doctor.    ..........................................................................................................................................  Patient/Patient Representative Signature      ..........................................................................................................................................  Patient Representative Print Name and Relationship to Patient    ..................................................               ................................................  Date                                   Time    ..........................................................................................................................................  Reviewed by Signature/Title    ...................................................              ..............................................  Date                                               Time          22EPIC Rev 08/18

## 2018-11-08 NOTE — ANESTHESIA CARE TRANSFER NOTE
Patient: Marlee Wise    Procedure(s):  Diagnostic Laparoscopy & Lysis of Adhesions    Diagnosis: stage IV Endometriosis  Diagnosis Additional Information: No value filed.    Anesthesia Type:   General, ETT     Note:  Airway :Face Mask  Patient transferred to:PACU  Handoff Report: Identifed the Patient, Identified the Reponsible Provider, Reviewed the pertinent medical history, Discussed the surgical course, Reviewed Intra-OP anesthesia mangement and issues during anesthesia, Set expectations for post-procedure period and Allowed opportunity for questions and acknowledgement of understanding      Vitals: (Last set prior to Anesthesia Care Transfer)    CRNA VITALS  11/8/2018 0941 - 11/8/2018 1013      11/8/2018             Resp Rate (set): 10                Electronically Signed By: MAYLIN CORCORAN CRNA  November 8, 2018  10:13 AM

## 2018-11-08 NOTE — IP AVS SNAPSHOT
MRN:[id number]                      After Visit Summary   11/8/2018    Marlee Wise    MRN: [id number]           Thank you!     Thank you for choosing [facility] for your care. Our goal is always to provide you with excellent care. Hearing back from our patients is one way we can continue to improve our services. Please take a few minutes to complete the written survey that you may receive in the mail after you visit with us. Thank you!        Patient Information     Date Of Birth          1980        About your hospital stay     You were admitted on:  November 8, 2018 You last received care in the:  [facility]    You were discharged on:  November 8, 2018       Who to Call     For medical emergencies, please call 911.  For non-urgent questions about your medical care, please call your primary care provider or clinic, [phone]  For questions related to your surgery, please call your surgery clinic        Attending Provider     Provider Specialty    Chuck Flor MD OB/Gyn       Primary Care Provider Office Phone # Fax #    Meseret ParmarMAYLIN carlos -[phone]      After Care Instructions     Discharge Instructions       Resume pre procedure diet            Discharge Instructions       Patient may return to work POD  3            No alcohol       NO ALCOHOL for 24 hours post procedure            No driving or operating machinery       No driving or operating machinery until day after procedure            Shower        Shower on Post-op day  1.   DO NOT take a bath                  Your next 10 appointments already scheduled     Nov 15, 2018  3:00 PM CST   SHORT with Chuck Flor MD   [facility] ([facility])    [address]  [place] [id number] [phone]              Further instructions from your care team       [facility]   Adult Discharge Orders & Instructions     For  24 hours after surgery    1. Get plenty of rest.  A responsible adult must stay with you for at least 24 hours after you leave the hospital.   2. Do not drive or use heavy equipment.  If you have weakness or tingling, don't drive or use heavy equipment until this feeling goes away.  3. Do not drink alcohol.  4. Avoid strenuous or risky activities.  Ask for help when climbing stairs.   5. You may feel lightheaded.  IF so, sit for a few minutes before standing.  Have someone help you get up.   6. If you have nausea (feel sick to your stomach): Drink only clear liquids such as apple juice, ginger ale, broth or 7-Up.  Rest may also help.  Be sure to drink enough fluids.  Move to a regular diet as you feel able.  7. You may have a slight fever. Call the doctor if your fever is over 101 F (38.3 C) (taken under the tongue) or lasts longer than 24 hours.  8. You may have a dry mouth, a sore throat, muscle aches or trouble sleeping.  These should go away after 24 hours.  9. Do not make important or legal decisions.   Call your doctor for any of the followin.  Signs of infection (fever, growing tenderness at the surgery site, a large amount of drainage or bleeding, severe pain, foul-smelling drainage, redness, swelling).    2. It has been over 8 to 10 hours since surgery and you are still not able to urinate (pass water).    3.  Headache for over 24 hours.    4.  Numbness, tingling or weakness the day after surgery (if you had spinal anesthesia).  To contact a doctor, call:   [phone]    Pending Results     No orders found from 2018 to 2018.            Admission Information     Date & Time Provider Department Dept. Phone    2018 Chuck Flor MD [facility] [phone]      Your Vitals Were     Blood Pressure Pulse Temperature Respirations Weight       109/83 74 97.4  F (36.3  C) (Temporal) 11 84.4 kg (186 lb)     Pulse Oximetry BMI (Body Mass Index)                95% 27.87  kg/m2          [facility] Information     [facility] gives you secure access to your electronic health record. If you see a primary care provider, you can also send messages to your care team and make appointments. If you have questions, please call your primary care clinic.  If you do not have a primary care provider, please call [phone] and they will assist you.        Care EveryWhere ID     This is your Care EveryWhere ID. This could be used by other organizations to access your [facility] medical records  [id number]        Equal Access to Services     EMELY LUJAN : Hadmaryam womack Sojulieta, waaxda luqadaha, qaybta kaalmada kate, jose alejandro duenas. So [facility] [phone].    ATENCIÓN: Si habla español, tiene a bear disposición servicios gratuitos de asistencia lingüística. Llame al [phone].    We comply with applicable federal civil rights laws and [place] laws. We do not discriminate on the basis of race, color, national origin, age, disability, sex, sexual orientation, or gender identity.               Review of your medicines      START taking        Dose / Directions    ibuprofen 600 MG tablet   Commonly known as:  ADVIL/MOTRIN   Used for:  Endometriosis        Dose:  600 mg   Take 1 tablet (600 mg) by mouth every 6 hours as needed for other (mild and/or inflammatory pain)   Quantity:  30 tablet   Refills:  1       oxyCODONE IR 5 MG tablet   Commonly known as:  ROXICODONE   Used for:  Endometriosis        Dose:  5-10 mg   Take 1-2 tablets (5-10 mg) by mouth every 4 hours as needed for moderate to severe pain   Quantity:  10 tablet   Refills:  0         CONTINUE these medicines which have NOT CHANGED        Dose / Directions    estradiol 1 MG tablet   Commonly known as:  ESTRACE   Used for:  Endometriosis        TAKE 2 AND 1/2 TABLETS(2.5 MG) BY MOUTH DAILY   Quantity:  80 tablet   Refills:  3            Where to get your medicines      These medications were sent to  [id number] - [place], [place] - [address]  [address], [facility] [address]     Phone:  [phone]     ibuprofen 600 MG tablet         Some of these will need a paper prescription and others can be bought over the counter. Ask your nurse if you have questions.     Bring a paper prescription for each of these medications     oxyCODONE IR 5 MG tablet                Protect others around you: Learn how to safely use, store and throw away your medicines at www.disposemymeds.org.        Information about OPIOIDS     PRESCRIPTION OPIOIDS: WHAT YOU NEED TO KNOW   We gave you an opioid (narcotic) pain medicine. It is important to manage your pain, but opioids are not always the best choice. You should first try all the other options your care team gave you. Take this medicine for as short a time (and as few doses) as possible.    Some activities can increase your pain, such as bandage changes or therapy sessions. It may help to take your pain medicine 30 to 60 minutes before these activities. Reduce your stress by getting enough sleep, working on hobbies you enjoy and practicing relaxation or meditation. Talk to your care team about ways to manage your pain beyond prescription opioids.    These medicines have risks:    DO NOT drive when on new or higher doses of pain medicine. These medicines can affect your alertness and reaction times, and you could be arrested for driving under the influence (DUI). If you need to use opioids long-term, talk to your care team about driving.    DO NOT operate heavy machinery    DO NOT do any other dangerous activities while taking these medicines.    DO NOT drink any alcohol while taking these medicines.     If the opioid prescribed includes acetaminophen, DO NOT take with any other medicines that contain acetaminophen. Read all labels carefully. Look for the word  acetaminophen  or  Tylenol.  Ask your pharmacist if you have questions  or are unsure.    You can get addicted to pain medicines, especially if you have a history of addiction (chemical, alcohol or substance dependence). Talk to your care team about ways to reduce this risk.    All opioids tend to cause constipation. Drink plenty of water and eat foods that have a lot of fiber, such as fruits, vegetables, prune juice, apple juice and high-fiber cereal. Take a laxative (Miralax, milk of magnesia, Colace, Senna) if you don t move your bowels at least every other day. Other side effects include upset stomach, sleepiness, dizziness, throwing up, tolerance (needing more of the medicine to have the same effect), physical dependence and slowed breathing.    Store your pills in a secure place, locked if possible. We will not replace any lost or stolen medicine. If you don t finish your medicine, please throw away (dispose) as directed by your pharmacist. The [place] Pollution Control Agency has more information about safe disposal: https://www.pca.[facility].[place]/living-green/managing-unwanted-medications             Medication List: This is a list of all your medications and when to take them. Check marks below indicate your daily home schedule. Keep this list as a reference.      Medications           Morning Afternoon Evening Bedtime As Needed    estradiol 1 MG tablet   Commonly known as:  ESTRACE   TAKE 2 AND 1/2 TABLETS(2.5 MG) BY MOUTH DAILY                                ibuprofen 600 MG tablet   Commonly known as:  ADVIL/MOTRIN   Take 1 tablet (600 mg) by mouth every 6 hours as needed for other (mild and/or inflammatory pain)                                oxyCODONE IR 5 MG tablet   Commonly known as:  ROXICODONE   Take 1-2 tablets (5-10 mg) by mouth every 4 hours as needed for moderate to severe pain   Last time this was given:  5 mg on 11/8/2018 11:43 AM                                          More Information        Understanding Lysis of Adhesions    Lysis of adhesions is a surgery  to cut bands of tissue that form between organs. These bands are called adhesions. They are often caused by scar tissue that formed after an earlier surgery. Adhesions can connect organs to each other. This can cause severe pain and stop organs from working well.  How to say it  Jesse Degroot   Why lysis of adhesions is done  Adhesions can cause severe, ongoing pain. Cutting the adhesions helps ease this pain. Adhesions can also cause blockage of the intestines. This blockage can lead to serious symptoms such as severe pain and vomiting. It can also cause long-term (permanent) damage to the intestines. It can even be fatal. You need surgery to prevent or treat these problems.  How lysis of adhesions is done  Lysis of adhesions may be done using a method called laparoscopy. This method uses a few small cuts (incisions) in your belly (abdomen). Or it may be done as open surgery, with a large cut.      You are given medicine (general anesthesia). This puts you into a deep sleep through the procedure.    For a laparoscopy, the healthcare provider makes 2 to 4 small incisions in your belly. A long, thin, lighted tube (laparoscope) with a camera on the end is placed in one of the cuts. The tube sends pictures of your belly to a video screen. This lets your healthcare provider see inside your belly. He or she puts tiny surgical tools through the other small cuts.The provider fills your belly with carbon dioxide. This gas makes more room in your belly so the provider can see and work more easily.    If open surgery is done, the provider makes a large cut in your belly. The laparoscope is not used.    The provider cuts and removes the adhesions. This lets the organs move more freely.    When the surgery is done, the scope and other tools are removed. The cuts are closed.  Risks of lysis of adhesions    Infection    Bleeding    Incisional hernia    Damage to abdominal organs    Damage to the intestine    Need to switch  to open surgery    Return of the adhesions    Risks of anesthesia    Death  Date Last Reviewed: 6/1/2016 2000-2018 [employer]. [address] [place] [address], [place] [address]. All rights reserved. This information is not intended as a substitute for professional medical care. Always follow your healthcare professional's instructions.

## 2018-11-09 NOTE — OP NOTE
Preoperative Diagnosis: pelvic pain, history of Stage IV endometriosis, s/p bilateral salpingo-oophrectomy, pelvic adhesions  Postoperative Diagnosis: same, pelvic adhesions  Procedure: Dx laparoscopy, lysis of adhesions  Surgeon: Chuck Flor MD  Assistant Surgeon: none    Clinical History: 38-year-old who has had a laparotomy for large ovarian endometriomas and subsequent bilateral salpingo-oophorectomy with persistent pelvic pain.  Uterus remains.  At the time of laparotomy it was noted that the colon was adhesed to the uterus and therefore hysterectomy not performed.  Basically describes midline pain and cramping.  Occasional dyspareunia.  No abnormal bleeding.    Findings: A dense band of adhesions between the anterior abdominal wall and the omentum is present in the midline.  This was lysed.  The remainder of abdominal wall is free.  The uterus is normal sized relatively mobile.  The colon is adhesed to the back of the uterus from the mid isthmic area on down.  These adhesions however appear to be amenable to laparoscopic lysis.  Both broad ligaments and sides of the uterus were free of adhesions.  The anterior cul-de-sac was free.  There was some hemosiderin staining of the peritoneal surface but no active endometriosis    Operative Report: The patient was taken to the operating room and received general anesthetic.  She was prepped and draped in Anthony stirrups in the usual fashion.  Bladder was emptied.  SCDs were utilized.  We did a transverse cutdown incision to the fascia tented up and incised transversely.  Stay sutures of 0 Vicryl were placed.  A balloon sleeve was then placed into the abdominal cavity and the abdomen insufflated to normal pressures.  The laparoscope was placed confirming abdominal presence with no evidence of trauma.  Upper abdomen was unremarkable.  Patient was placed in Trendelenburg.  One auxiliary port was placed in the left lower quadrant.  The above adhesion was noted and it  was subsequently cauterized and divided.  The pelvis was then thoroughly explored with the above findings noted.  There was no additional pathology that warranted repair at this time.  Pictures were taken for the patient.  Pneumoperitoneum was released.  Laparoscopic incisions were closed with 3-0 Vicryl and Dermabond applied to the skin.    Should the patient require a hysterectomy down the road a laparoscopic hysterectomy with general surgery standby would be appropriate.    Patient tolerated the procedure well and was taken to recovery in stable condition:    EBL: Less than 10 cc    Complications: None apparent

## 2018-11-11 ENCOUNTER — MYC REFILL (OUTPATIENT)
Dept: OBGYN | Facility: OTHER | Age: 38
End: 2018-11-11

## 2018-11-11 DIAGNOSIS — N80.9 ENDOMETRIOSIS: ICD-10-CM

## 2018-11-11 NOTE — LETTER
November 12, 2018      Marlee Wise  [address] [facility] [address]        To Whom It May Concern,      Please excuse Marlee Wise from work Monday, November 12, 2018 and Tuesday, November 13, 2018 for medical reasons.  She may return to work on Wednesday, November 14, 2018 without any restrictions.      Sincerely,        Chuck Flor MD

## 2018-11-12 RX ORDER — OXYCODONE AND ACETAMINOPHEN 10; 325 MG/1; MG/1
1 TABLET ORAL EVERY 6 HOURS PRN
Qty: 12 TABLET | Refills: 0 | OUTPATIENT
Start: 2018-11-12

## 2018-11-12 NOTE — TELEPHONE ENCOUNTER
This RN telephoned pt.  Pt is requesting additional Percocet Rx after having a diagnostic laparoscopy with lysis of adhesions on 11-8-18.  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  States she is out of the Percocet pills and wants additional ones for when she is working in case she has pain.  Advised and instructed of protocol of once narcotics Rx is completed, pt is to alternate Tylenol 650mg po Q4hrs prn with ibuprofen 200mg po Q4hrs prn.  Pt verbalizes understanding.  Pt used numerous profanities projected towards this RN regarding not refilling her Rx.  Pt asked if she could have a letter stating for her to be off of work until 11-14-18.  Dr. JUANIS Flor notified of above.  Letter written per MD.  Pt advised and instructed of above, that letter would be waiting for her at the [address] window.  Pt verbalizes understanding.  Krysta Suero RN on 11/12/2018 at 11:51 AM

## 2018-11-13 RX ORDER — OXYCODONE AND ACETAMINOPHEN 10; 325 MG/1; MG/1
1 TABLET ORAL EVERY 6 HOURS PRN
Qty: 12 TABLET | Refills: 0 | OUTPATIENT
Start: 2018-11-13

## 2018-12-04 NOTE — TELEPHONE ENCOUNTER
FUTURE VISIT INFORMATION      FUTURE VISIT INFORMATION:    Date: 12/7/18    Time:     Location: [facility]  REFERRAL INFORMATION:    Referring provider:  Selvin Bhagat    Referring providers clinic:  [facility]    Reason for visit/diagnosis  Left knee chondromalacia and patellar defect, referred by Selvin Bhagat at [facility]. Records received, patient h/c images    RECORDS REQUESTED FROM:       Clinic name Comments Records Status Imaging Status     received internal

## 2018-12-07 ENCOUNTER — OFFICE VISIT (OUTPATIENT)
Dept: ORTHOPEDICS | Facility: CLINIC | Age: 38
End: 2018-12-07
Payer: COMMERCIAL

## 2018-12-07 ENCOUNTER — PRE VISIT (OUTPATIENT)
Dept: ORTHOPEDICS | Facility: CLINIC | Age: 38
End: 2018-12-07

## 2018-12-07 VITALS — WEIGHT: 185 LBS | BODY MASS INDEX: 27.4 KG/M2 | HEIGHT: 69 IN

## 2018-12-07 DIAGNOSIS — M23.8X9: Primary | ICD-10-CM

## 2018-12-07 RX ADMIN — TRIAMCINOLONE ACETONIDE 80 MG: 40 INJECTION, SUSPENSION INTRA-ARTICULAR; INTRAMUSCULAR at 12:08

## 2018-12-07 RX ADMIN — LIDOCAINE HYDROCHLORIDE 8 ML: 10 INJECTION, SOLUTION EPIDURAL; INFILTRATION; INTRACAUDAL; PERINEURAL at 12:08

## 2018-12-07 NOTE — NURSING NOTE
[address] [address]  Dept: [phone]  ______________________________________________________________________________    Patient: Marlee Wise   : 1980   MRN: [id number]   2018    INVASIVE PROCEDURE SAFETY CHECKLIST    Date: 18   Procedure: Left knee CSI with Kenalog  Patient Name: Marlee Wise  MRN: [id number]  YOB: 1980    Action: Complete sections as appropriate. Any discrepancy results in a HARD COPY until resolved.     PRE PROCEDURE:  Patient ID verified with 2 identifiers (name and  or MRN): Yes  Procedure and site verified with patient/designee (when able): Yes  Accurate consent documentation in medical record: Yes  H&P (or appropriate assessment) documented in medical record: Yes  H&P must be up to 20 days prior to procedure and updates within 24 hours of procedure as applicable: NA  Relevant diagnostic and radiology test results appropriately labeled and displayed as applicable: Yes  Procedure site(s) marked with provider initials: NA    TIMEOUT:  Time-Out performed immediately prior to starting procedure, including verbal and active participation of all team members addressing the following:Yes  * Correct patient identify  * Confirmed that the correct side and site are marked  * An accurate procedure consent form  * Agreement on the procedure to be done  * Correct patient position  * Relevant images and results are properly labeled and appropriately displayed  * The need to administer antibiotics or fluids for irrigation purposes during the procedure as applicable   * Safety precautions based on patient history or medication use    DURING PROCEDURE: Verification of correct person, site, and procedures any time the responsibility for care of the patient is transferred to another member of the care team.     The following medication was given:     MEDICATION:  Kenalog 80 mg  ROUTE:  Intraarticular  SITE: Left knee  DOSE: 80mg  LOT #: [id number]  : [employer]  EXPIRATION DATE: 4/1/20  NDC#: [address]-2   Was there drug waste? No  MEDICATION:  Lidocaine without epinephrine  ROUTE: intraarticular  SITE: left knee  DOSE: 8cc  LOT #: [id number]  : [employer]  EXPIRATION DATE: 6/1/22  NDC#: 71068[address]772-93   Was there drug waste? Yes  Amount of drug waste (mL): 22.  Reason for waste:  Single use vial      Cici Carpenter, ATC  December 7, 2018

## 2018-12-07 NOTE — LETTER
2018      RE: Marlee Wise  [address] [facility] [address]       [facility]  Orthopedics  Brandt Hernandez MD  2018     Name: Marlee Wise  MRN: [id number]  Age: 38 year old  : 1980  Referring provider: Yobani Bhagat     Chief Complaint: left knee pain     History of Present Illness:   Marlee Wise is a 38 year old female who presents today for evaluation of left knee pain. The patient reports that she has left knee popping and grinding. With flexion, she feels pulling medially and has burning pain. She also has pain to the posterior knee, which feels swollen. She denies instability. No history of injury. She reports that she did have to wear a knee brace at one point when she was younger but does not remember why or on which knee. Her right knee also pops but is not painful. She not done physical therapy and has not had any injections. She has been using ibuprofen for pain. 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      Review of Systems:   A 10-point review of systems was obtained and is negative except for as noted in the HPI.     Medications:     Current Outpatient Prescriptions:      estradiol (ESTRACE) 1 MG tablet, TAKE 2 AND 1/2 TABLETS(2.5 MG) BY MOUTH DAILY, Disp: 80 tablet, Rfl: 3     ibuprofen (ADVIL/MOTRIN) 600 MG tablet, Take 1 tablet (600 mg) by mouth every 6 hours as needed for other (mild and/or inflammatory pain), Disp: 30 tablet, Rfl: 1     oxyCODONE IR (ROXICODONE) 5 MG tablet, Take 1-2 tablets (5-10 mg) by mouth every 4 hours as needed for moderate to severe pain (Patient not taking: Reported on 2018), Disp: 10 tablet, Rfl: 0     oxyCODONE-acetaminophen (PERCOCET)  MG per tablet, Take 1 tablet by mouth every 6 hours as needed for moderate to severe pain or severe pain (Patient not taking: Reported on 2018), Disp: 12 tablet, Rfl: 0    Allergies:  Allergies   Allergen Reactions     Hydrocodone      Other reaction(s):  "Flushing     Tetanus-Diphtheria Toxoids Unknown     Past Medical History:  Depression     Past Surgical History:  Right rotator cuff repair 2007 Dr. Weems   Diagnostic Laparoscopy & Lysis of Adhesions 11/8/2018   Salpingo-oophorectomy bilateral 03/20/2017   Tonsillectomy 1998     Social History:  Patient is . Current every day smoker, 0.50 ppd. Occasional alcohol use.     Family History:  Positive for hypertension and hyperlipidemia.   Negative for easy bleeding or blood clots.   Multiple family members have had knee replacements.     Physical Examination:  Height 1.74 m (5' 8.5\"), weight 83.9 kg (185 lb), not currently breastfeeding.  General: Alert, oriented, no distress.  Skin: Cool to touch without erythema, ecchymosis, or lesions. No dystrophic changes.  Neuro: Neurovascularly intact distally. Sensation intact to light touch.  Cardiovascular: Capillary refill brisk.  Right Knee: No effusion. Range of motion from 2 degrees of hyperextension to 140 degrees of flexion. No J tracking. Ligaments are stable.   Left Knee: Trace effusion. Range of motion from 2 degrees of hyperextension to 140 degrees of flexion. No J tracking.     Imaging:   MR left knee without contrast 11/01/2018:   There is mild-to-moderate chondromalacia the medial  compartment. There is small focal area of grade 3 and grade IV  chondromalacia of the lateral patellar facet of the patellofemoral  articulation.     There is a small joint effusion.     There is no evidence of a meniscal or ligamentous injury.  Per radiology.     XR left knee standing 1 view AP bilateral and 2 views 10/29/2018:   No acute fracture.    Per radiology.     I have independently reviewed the above imaging studies; the results were discussed with the patient.     Assessment:   38 year old female with:   1. Left knee pain, diffuse.   2. Left knee patellar chondral injury.     Plan:   1. The risks and benefits of the available surgical and non-surgical treatment " options were discussed with the patient. We discussed options of physical therapy, injections, including cortisone injection, hyaluronic acid, and PRP, and surgery. Surgery would include knee arthroscopy, cartilage debridement, and articular cartilage biopsy. If this did not provide adequate pain relief, cartilage graft and tibial tubercle osteotomy could be considered in the future. All of the patient's questions were answered and the operative procedure was explained. Specific risks addressed today include, but are not limited to, adverse effects of anesthesia, infection, bleeding, pain, stiffness, blood clots, nerve and vessel damage, delayed healing, and re-injury. The possible restrictions during rehab were also discussed.   2. The patient elected to proceed with cortisone injection today for diagnostic and therapeutic purposes. This was performed today, please see procedure note below. She will monitor for improvement from the lidocaine. If she has improvement but pain returns, we will consider arthroscopy. She will call with an update.   3. The patient was counseled on smoking cessation.     Procedure:   After written informed consent was obtained, the patient's left knee was prepped with chloraprep.  A combination of 8 ccs of 1 percent lidocaine and 2 ccs (80 mg) of kenalog was injected into the left knee. There were no immediate complications.    Scribe Disclosure:   IBarbara, am serving as a scribe to document services personally performed by Brandt Hernandez MD at this visit, based upon the provider's statements to me. All documentation has been reviewed by the aforementioned provider prior to being entered into the official medical record.      Large Joint Injection/Arthocentesis  Date/Time: 12/7/2018 12:08 PM  Performed by: Brandt Hernandez MD  Authorized by: Brandt Hernandez MD     Indications:  Pain  Needle Size:  22 G  Guidance: landmark guided    Approach:   Anterolateral  Location:  Knee  Site:  L knee joint  Medications:  8 mL lidocaine (PF) 1 %; 80 mg triamcinolone 40 MG/ML  Outcome:  Tolerated well, no immediate complications  Procedure discussed: discussed risks, benefits, and alternatives    Consent Given by:  Patient  Timeout: timeout called immediately prior to procedure    Prep: patient was prepped and draped in usual sterile fashion     Scribed by Cici Carpenter ATC for Dr. Hernandez on 12/7/18 at 11:45AM, based on the provider s statements to me.            Brandt Hernandez MD

## 2018-12-07 NOTE — PROGRESS NOTES
[facility]  Orthopedics  Brandt Hernandez MD  2018     Name: Marlee Wise  MRN: [id number]  Age: 38 year old  : 1980  Referring provider: Yobani Bhagat     Chief Complaint: left knee pain     History of Present Illness:   Marlee Wise is a 38 year old female who presents today for evaluation of left knee pain. The patient reports that she has left knee popping and grinding. With flexion, she feels pulling medially and has burning pain. She also has pain to the posterior knee, which feels swollen. She denies instability. No history of injury. She reports that she did have to wear a knee brace at one point when she was younger but does not remember why or on which knee. Her right knee also pops but is not painful. She not done physical therapy and has not had any injections. She has been using ibuprofen for pain. 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      Review of Systems:   A 10-point review of systems was obtained and is negative except for as noted in the HPI.     Medications:     Current Outpatient Prescriptions:      estradiol (ESTRACE) 1 MG tablet, TAKE 2 AND 1/2 TABLETS(2.5 MG) BY MOUTH DAILY, Disp: 80 tablet, Rfl: 3     ibuprofen (ADVIL/MOTRIN) 600 MG tablet, Take 1 tablet (600 mg) by mouth every 6 hours as needed for other (mild and/or inflammatory pain), Disp: 30 tablet, Rfl: 1     oxyCODONE IR (ROXICODONE) 5 MG tablet, Take 1-2 tablets (5-10 mg) by mouth every 4 hours as needed for moderate to severe pain (Patient not taking: Reported on 2018), Disp: 10 tablet, Rfl: 0     oxyCODONE-acetaminophen (PERCOCET)  MG per tablet, Take 1 tablet by mouth every 6 hours as needed for moderate to severe pain or severe pain (Patient not taking: Reported on 2018), Disp: 12 tablet, Rfl: 0    Allergies:  Allergies   Allergen Reactions     Hydrocodone      Other reaction(s): Flushing     Tetanus-Diphtheria Toxoids Unknown     Past Medical  "History:  Depression     Past Surgical History:  Right rotator cuff repair 2007 Dr. Weems   Diagnostic Laparoscopy & Lysis of Adhesions 11/8/2018   Salpingo-oophorectomy bilateral 03/20/2017   Tonsillectomy 1998     Social History:  Patient is . Current every day smoker, 0.50 ppd. Occasional alcohol use.     Family History:  Positive for hypertension and hyperlipidemia.   Negative for easy bleeding or blood clots.   Multiple family members have had knee replacements.     Physical Examination:  Height 1.74 m (5' 8.5\"), weight 83.9 kg (185 lb), not currently breastfeeding.  General: Alert, oriented, no distress.  Skin: Cool to touch without erythema, ecchymosis, or lesions. No dystrophic changes.  Neuro: Neurovascularly intact distally. Sensation intact to light touch.  Cardiovascular: Capillary refill brisk.  Right Knee: No effusion. Range of motion from 2 degrees of hyperextension to 140 degrees of flexion. No J tracking. Ligaments are stable.   Left Knee: Trace effusion. Range of motion from 2 degrees of hyperextension to 140 degrees of flexion. No J tracking.     Imaging:   MR left knee without contrast 11/01/2018:   There is mild-to-moderate chondromalacia the medial  compartment. There is small focal area of grade 3 and grade IV  chondromalacia of the lateral patellar facet of the patellofemoral  articulation.     There is a small joint effusion.     There is no evidence of a meniscal or ligamentous injury.  Per radiology.     XR left knee standing 1 view AP bilateral and 2 views 10/29/2018:   No acute fracture.    Per radiology.     I have independently reviewed the above imaging studies; the results were discussed with the patient.     Assessment:   38 year old female with:   1. Left knee pain, diffuse.   2. Left knee patellar chondral injury.     Plan:   1. The risks and benefits of the available surgical and non-surgical treatment options were discussed with the patient. We discussed options of " physical therapy, injections, including cortisone injection, hyaluronic acid, and PRP, and surgery. Surgery would include knee arthroscopy, cartilage debridement, and articular cartilage biopsy. If this did not provide adequate pain relief, cartilage graft and tibial tubercle osteotomy could be considered in the future. All of the patient's questions were answered and the operative procedure was explained. Specific risks addressed today include, but are not limited to, adverse effects of anesthesia, infection, bleeding, pain, stiffness, blood clots, nerve and vessel damage, delayed healing, and re-injury. The possible restrictions during rehab were also discussed.   2. The patient elected to proceed with cortisone injection today for diagnostic and therapeutic purposes. This was performed today, please see procedure note below. She will monitor for improvement from the lidocaine. If she has improvement but pain returns, we will consider arthroscopy. She will call with an update.   3. The patient was counseled on smoking cessation.     Procedure:   After written informed consent was obtained, the patient's left knee was prepped with chloraprep.  A combination of 8 ccs of 1 percent lidocaine and 2 ccs (80 mg) of kenalog was injected into the left knee. There were no immediate complications.    Scribe Disclosure:   I, Barbara Blanca, am serving as a scribe to document services personally performed by Brandt Hernandez MD at this visit, based upon the provider's statements to me. All documentation has been reviewed by the aforementioned provider prior to being entered into the official medical record.

## 2018-12-07 NOTE — PROGRESS NOTES
Large Joint Injection/Arthocentesis  Date/Time: 12/7/2018 12:08 PM  Performed by: Brandt Hernandez MD  Authorized by: Brandt Hernandez MD     Indications:  Pain  Needle Size:  22 G  Guidance: landmark guided    Approach:  Anterolateral  Location:  Knee  Site:  L knee joint  Medications:  8 mL lidocaine (PF) 1 %; 80 mg triamcinolone 40 MG/ML  Outcome:  Tolerated well, no immediate complications  Procedure discussed: discussed risks, benefits, and alternatives    Consent Given by:  Patient  Timeout: timeout called immediately prior to procedure    Prep: patient was prepped and draped in usual sterile fashion     Scribed by Cici Carpenter ATC for Dr. Hernandez on 12/7/18 at 11:45AM, based on the provider s statements to me.

## 2018-12-09 RX ORDER — LIDOCAINE HYDROCHLORIDE 10 MG/ML
8 INJECTION, SOLUTION EPIDURAL; INFILTRATION; INTRACAUDAL; PERINEURAL
Status: DISCONTINUED | OUTPATIENT
Start: 2018-12-07 | End: 2019-02-08

## 2018-12-09 RX ORDER — TRIAMCINOLONE ACETONIDE 40 MG/ML
80 INJECTION, SUSPENSION INTRA-ARTICULAR; INTRAMUSCULAR ONCE
Qty: 2 ML | Refills: 0 | OUTPATIENT
Start: 2018-12-09 | End: 2019-02-08

## 2018-12-09 RX ORDER — TRIAMCINOLONE ACETONIDE 40 MG/ML
80 INJECTION, SUSPENSION INTRA-ARTICULAR; INTRAMUSCULAR
Status: DISCONTINUED | OUTPATIENT
Start: 2018-12-07 | End: 2019-02-08

## 2019-02-08 ENCOUNTER — OFFICE VISIT (OUTPATIENT)
Dept: FAMILY MEDICINE | Facility: OTHER | Age: 39
End: 2019-02-08
Attending: NURSE PRACTITIONER
Payer: COMMERCIAL

## 2019-02-08 VITALS
BODY MASS INDEX: 26.11 KG/M2 | DIASTOLIC BLOOD PRESSURE: 80 MMHG | HEIGHT: 69 IN | WEIGHT: 176.25 LBS | HEART RATE: 68 BPM | TEMPERATURE: 98.5 F | SYSTOLIC BLOOD PRESSURE: 128 MMHG | OXYGEN SATURATION: 98 % | RESPIRATION RATE: 18 BRPM

## 2019-02-08 DIAGNOSIS — J06.9 VIRAL URI WITH COUGH: Primary | ICD-10-CM

## 2019-02-08 DIAGNOSIS — F41.1 GAD (GENERALIZED ANXIETY DISORDER): ICD-10-CM

## 2019-02-08 LAB
DEPRECATED S PYO AG THROAT QL EIA: NORMAL
SPECIMEN SOURCE: NORMAL

## 2019-02-08 PROCEDURE — G0463 HOSPITAL OUTPT CLINIC VISIT: HCPCS

## 2019-02-08 PROCEDURE — 87880 STREP A ASSAY W/OPTIC: CPT | Performed by: NURSE PRACTITIONER

## 2019-02-08 PROCEDURE — 99213 OFFICE O/P EST LOW 20 MIN: CPT | Performed by: NURSE PRACTITIONER

## 2019-02-08 RX ORDER — ALPRAZOLAM 0.5 MG
0.5 TABLET ORAL 3 TIMES DAILY PRN
Qty: 12 TABLET | Refills: 0 | Status: SHIPPED | OUTPATIENT
Start: 2019-02-08 | End: 2019-03-10

## 2019-02-08 ASSESSMENT — PATIENT HEALTH QUESTIONNAIRE - PHQ9
5. POOR APPETITE OR OVEREATING: NEARLY EVERY DAY
SUM OF ALL RESPONSES TO PHQ QUESTIONS 1-9: 16

## 2019-02-08 ASSESSMENT — ANXIETY QUESTIONNAIRES
1. FEELING NERVOUS, ANXIOUS, OR ON EDGE: NEARLY EVERY DAY
IF YOU CHECKED OFF ANY PROBLEMS ON THIS QUESTIONNAIRE, HOW DIFFICULT HAVE THESE PROBLEMS MADE IT FOR YOU TO DO YOUR WORK, TAKE CARE OF THINGS AT HOME, OR GET ALONG WITH OTHER PEOPLE: EXTREMELY DIFFICULT
3. WORRYING TOO MUCH ABOUT DIFFERENT THINGS: NEARLY EVERY DAY
5. BEING SO RESTLESS THAT IT IS HARD TO SIT STILL: NEARLY EVERY DAY
6. BECOMING EASILY ANNOYED OR IRRITABLE: NEARLY EVERY DAY
2. NOT BEING ABLE TO STOP OR CONTROL WORRYING: NEARLY EVERY DAY
7. FEELING AFRAID AS IF SOMETHING AWFUL MIGHT HAPPEN: MORE THAN HALF THE DAYS
GAD7 TOTAL SCORE: 20

## 2019-02-08 ASSESSMENT — PAIN SCALES - GENERAL: PAINLEVEL: SEVERE PAIN (7)

## 2019-02-08 ASSESSMENT — MIFFLIN-ST. JEOR: SCORE: 1540.9

## 2019-02-08 NOTE — PROGRESS NOTES
"  SUBJECTIVE:   Marlee Wise is a 39 year old female who presents to clinic today for the following health issues:    Depression and Anxiety Follow-Up    Status since last visit: Worsened, usually sees Isamar Herrera for medication management, unsure of last visit though experiencing significant symptoms    Other associated symptoms:palpitations, on edge, tearful, difficulty sleeping, fatigue    Complicating factors:     Significant life event: No     Current substance abuse: None    Was on Xanax through Isamar Herrera in the past, has not had for a while. Has tried a lot of depression medications without improvement in symptoms. Is wanting a prescription for something until she can get in to see Isamar.     PHQ 10/9/2018 10/29/2018 2/8/2019   PHQ-9 Total Score 14 16 16   Q9: Suicide Ideation Not at all Not at all Not at all     MATILDE-7 SCORE 10/9/2018 10/29/2018 2/8/2019   Total Score 18 16 20     In the past two weeks have you had thoughts of suicide or self-harm?  No.    Do you have concerns about your personal safety or the safety of others?   No  PHQ-9  English  PHQ-9   Any Language  MATILDE-7  Suicide Assessment Five-step Evaluation and Treatment (SAFE-T)    Amount of exercise or physical activity: 4-5 days/week for an average of greater than 60 minutes    Problems taking medications regularly: No    Medication side effects: none    Diet: regular (no restrictions)      ENT Symptoms             Symptoms: cc Present Absent Comment   Fever/Chills  x  \"really cold lately\"   Fatigue  x     Muscle Aches   x    Eye Irritation   x    Sneezing   x    Nasal Faustino/Drg  x     Sinus Pressure/Pain   x    Loss of smell   x    Dental pain   x    Sore Throat  x     Swollen Glands  x     Ear Pain/Fullness  x  Left ear   Cough   x    Wheeze   x    Chest Pain  x     Shortness of breath  x     Rash   x    Other   x      Symptom duration:  yesterday   Symptom severity:  severe   Treatments tried:  motrin, water   Contacts:  none "       Problem list and histories reviewed & adjusted, as indicated.  Additional history: as documented    Patient Active Problem List   Diagnosis     Abdominal pain, left lower quadrant     Endometriosis     S/P exploratory laparotomy     Surgical menopause on hormone replacement therapy     Tobacco abuse     Past Surgical History:   Procedure Laterality Date     ARTHROSCOPY SHOULDER      2007,Right rotator cuff tear, Dr. Weems     LAPAROSCOPY DIAGNOSTIC (GYN) N/A 11/8/2018    Procedure: Diagnostic Laparoscopy & Lysis of Adhesions;  Surgeon: Chuck Flor MD;  Location: [facility] OR     SALPINGO-OOPHORECTOMY BILATERAL Bilateral 03/20/2017     TONSILLECTOMY      1998       Social History     Tobacco Use     Smoking status: Current Every Day Smoker     Packs/day: 0.50     Types: Cigarettes     Smokeless tobacco: Never Used   Substance Use Topics     Alcohol use: Yes     Alcohol/week: 0.0 oz     Comment: occasional use     Family History   Problem Relation Age of Onset     Hypertension Mother      Hyperlipidemia Mother          Current Outpatient Medications   Medication Sig Dispense Refill     estradiol (ESTRACE) 1 MG tablet TAKE 2 AND 1/2 TABLETS(2.5 MG) BY MOUTH DAILY 80 tablet 3     Allergies   Allergen Reactions     Hydrocodone      Other reaction(s): Flushing     Tetanus-Diphtheria Toxoids Unknown       Reviewed and updated as needed this visit by clinical staff  Tobacco  Allergies  Meds  Problems  Med Hx  Surg Hx  Fam Hx  Soc Hx        Reviewed and updated as needed this visit by Provider  Allergies  Meds  Problems         ROS:  RESP:NEGATIVE for significant cough or SOB and as above  CV: POSITIVE for palpitations  GI: POSITIVE for nausea and vomiting  : NEGATIVE for frequency, dysuria, or hematuria  MUSCULOSKELETAL:POSITIVE  for arthralgias and myalgia  NEURO: NEGATIVE for weakness, dizziness or paresthesias  ENDOCRINE: NEGATIVE for temperature intolerance, skin/hair changes  HEME: NEGATIVE for  "bleeding problems  PSYCHIATRIC: POSITIVE foragitation, anxiety, appetite disturbance, concentration difficulty, depressed mood, Hx anxiety, Hx depression, fatigue, feelings of worthlessness/guilt, hopelessness, impaired memory, insomnia, obsessive thoughts, psychomotor agitation and stress    OBJECTIVE:     /80   Pulse 68   Temp 98.5  F (36.9  C) (Temporal)   Resp 18   Ht 1.74 m (5' 8.5\")   Wt 79.9 kg (176 lb 4 oz)   SpO2 98%   BMI 26.41 kg/m    Body mass index is 26.41 kg/m .  GENERAL: healthy, alert and moderately distressed (anxiety)  EYES: Eyes grossly normal to inspection, PERRL and conjunctivae and sclerae normal  HENT: normal cephalic/atraumatic, ear canals and TM's normal, nose and mouth without ulcers or lesions, rhinorrhea clear, oral mucous membranes moist, sinuses: not tender and cobblestoning of posterior oropharynx  NECK: no adenopathy, no asymmetry, masses, or scars and thyroid normal to palpation  RESP: lungs clear to auscultation - no rales, rhonchi or wheezes  CV: regular rate and rhythm, normal S1 S2, no S3 or S4, no murmur, click or rub, no peripheral edema and peripheral pulses strong  SKIN: no suspicious lesions or rashes  NEURO: Normal strength and tone, mentation intact and speech normal  PSYCH: mentation appears normal, tearful and anxious    Diagnostic Test Results:  Results for orders placed or performed in visit on 02/08/19 (from the past 24 hour(s))   Strep, Rapid Screen   Result Value Ref Range    Specimen Description Throat     Rapid Strep A Screen       Negative presumptive for Group A Beta Streptococcus       ASSESSMENT/PLAN:     1. Viral URI with cough  Rapid strep negative, tonsils out in past.   Symptomatic treatments recommended.  -Discussed that antibiotics would not help symptoms of viral URI. Education provided on symptoms of secondary bacterial infection such as new fever, chills, rigors, shortness of breath, increased work of breathing, that can occur with viral " "URI and need for further evaluation, if they occur.   - Ensure you are staying hydrated by drinking plenty of fluids or eating foods such as popsicles, jello, pudding.  - Honey and Salt water gurgles can help soothe sore throat  - Rest  - Humidifier can help with congestion and help keep mucus membranes such as throat and nose from drying out.  - Sleeping slightly propped up can help with congestion and postnasal drainage that can worsen cough at bedtime.  - As long as you have never been told to take Tylenol and/or Ibuprofen you can use them to manage fever and body aches per package instructions  Make sure you eat when you take ibuprofen to avoid stomach upset.  - OTC cough medications per package instructions to help with cough. Check to see if the cough/cold medication already has acetaminophen (Tylenol) in it. If it does avoid taking additional Tylenol.  - If sudden onset of new fever, worsening symptoms return for further evaluation.  - OTC antihistamine such as Allegra, Zyrtec, Claritin (generic is okay) can help with nasal/sinus congestion and OTC nasal steroid such as Flonase can help decrease sinus inflammation to help with congestion.    - Strep, Rapid Screen    2. MATILDE (generalized anxiety disorder)  Longstanding issues with anxiety, sees Isamar Herrera for medication management though admits she has not seen her for \"quite a while\". States she does not like to take meds and tries to stop every once in a while, but admits she needs them to control her anxiety. States she has been on \"a lot of different depression and anxiety meds, my whole family has, but nothing really helps much without horrible side effects. Xanax is the only thing that works for me when I need it\".  without any scripts for mental health, limited supply of xanax given today. Patient to make appt with Isamar for further medication management, though may need to return to clinic prior to appt (I am unsure of how far out she is " scheduling).   - ALPRAZolam (XANAX) 0.5 MG tablet; Take 1 tablet (0.5 mg) by mouth 3 times daily as needed for anxiety  Dispense: 12 tablet; Refill: 0    Due for pap, general physical. Will call and set up with PCP.     Brinda Cruz NP  [facility]

## 2019-02-08 NOTE — NURSING NOTE
"Chief Complaint   Patient presents with     Pharyngitis     Having depression and anxiety a lot.    Initial /80   Pulse 68   Temp 98.5  F (36.9  C) (Temporal)   Resp 18   Ht 1.74 m (5' 8.5\")   Wt 79.9 kg (176 lb 4 oz)   SpO2 98%   BMI 26.41 kg/m   Estimated body mass index is 26.41 kg/m  as calculated from the following:    Height as of this encounter: 1.74 m (5' 8.5\").    Weight as of this encounter: 79.9 kg (176 lb 4 oz).    Medication Reconciliation: complete      Norma J. Gosselin, LPN  "

## 2019-02-09 ASSESSMENT — ANXIETY QUESTIONNAIRES: GAD7 TOTAL SCORE: 20

## 2019-07-26 ENCOUNTER — HOSPITAL ENCOUNTER (EMERGENCY)
Facility: OTHER | Age: 39
Discharge: HOME | End: 2019-07-26
Attending: FAMILY MEDICINE | Admitting: FAMILY MEDICINE
Payer: COMMERCIAL

## 2019-07-26 VITALS
OXYGEN SATURATION: 97 % | WEIGHT: 160 LBS | HEART RATE: 64 BPM | RESPIRATION RATE: 16 BRPM | TEMPERATURE: 96.5 F | BODY MASS INDEX: 24.25 KG/M2 | HEIGHT: 68 IN | DIASTOLIC BLOOD PRESSURE: 78 MMHG | SYSTOLIC BLOOD PRESSURE: 112 MMHG

## 2019-07-26 DIAGNOSIS — H00.014 HORDEOLUM EXTERNUM OF LEFT UPPER EYELID: ICD-10-CM
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
## 2019-07-26 LAB
ALBUMIN SERPL-MCNC: 4.3 G/DL (ref 3.5–5.7)
ALBUMIN UR-MCNC: NEGATIVE MG/DL
ALP SERPL-CCNC: 52 U/L (ref 34–104)
ALT SERPL W P-5'-P-CCNC: 11 U/L (ref 7–52)
ANION GAP SERPL CALCULATED.3IONS-SCNC: 2 MMOL/L (ref 3–14)
APPEARANCE UR: CLEAR
AST SERPL W P-5'-P-CCNC: 11 U/L (ref 13–39)
BACTERIA #/AREA URNS HPF: ABNORMAL /HPF
BASOPHILS # BLD AUTO: 0.1 10E9/L (ref 0–0.2)
BASOPHILS NFR BLD AUTO: 0.7 %
BILIRUB SERPL-MCNC: 0.2 MG/DL (ref 0.3–1)
BILIRUB UR QL STRIP: ABNORMAL
BUN SERPL-MCNC: 14 MG/DL (ref 7–25)
CALCIUM SERPL-MCNC: 8.9 MG/DL (ref 8.6–10.3)
CHLORIDE SERPL-SCNC: 113 MMOL/L (ref 98–107)
CO2 SERPL-SCNC: 29 MMOL/L (ref 21–31)
COLOR UR AUTO: YELLOW
CREAT SERPL-MCNC: 0.66 MG/DL (ref 0.6–1.2)
CRP SERPL-MCNC: 0.1 MG/L
DIFFERENTIAL METHOD BLD: NORMAL
EOSINOPHIL # BLD AUTO: 0.4 10E9/L (ref 0–0.7)
EOSINOPHIL NFR BLD AUTO: 5.4 %
ERYTHROCYTE [DISTWIDTH] IN BLOOD BY AUTOMATED COUNT: 12.6 % (ref 10–15)
GFR SERPL CREATININE-BSD FRML MDRD: >90 ML/MIN/{1.73_M2}
GLUCOSE SERPL-MCNC: 88 MG/DL (ref 70–105)
GLUCOSE UR STRIP-MCNC: NEGATIVE MG/DL
HCT VFR BLD AUTO: 42.8 % (ref 35–47)
HGB BLD-MCNC: 14.5 G/DL (ref 11.7–15.7)
HGB UR QL STRIP: NEGATIVE
IMM GRANULOCYTES # BLD: 0 10E9/L (ref 0–0.4)
IMM GRANULOCYTES NFR BLD: 0.1 %
KETONES UR STRIP-MCNC: ABNORMAL MG/DL
LEUKOCYTE ESTERASE UR QL STRIP: ABNORMAL
LYMPHOCYTES # BLD AUTO: 1.6 10E9/L (ref 0.8–5.3)
LYMPHOCYTES NFR BLD AUTO: 22.6 %
MCH RBC QN AUTO: 32.5 PG (ref 26.5–33)
MCHC RBC AUTO-ENTMCNC: 33.9 G/DL (ref 31.5–36.5)
MCV RBC AUTO: 96 FL (ref 78–100)
MONOCYTES # BLD AUTO: 0.6 10E9/L (ref 0–1.3)
MONOCYTES NFR BLD AUTO: 8 %
NEUTROPHILS # BLD AUTO: 4.4 10E9/L (ref 1.6–8.3)
NEUTROPHILS NFR BLD AUTO: 63.2 %
NITRATE UR QL: NEGATIVE
NON-SQ EPI CELLS #/AREA URNS LPF: ABNORMAL /LPF
PH UR STRIP: 6 PH (ref 5–9)
PLATELET # BLD AUTO: 250 10E9/L (ref 150–450)
POTASSIUM SERPL-SCNC: 4 MMOL/L (ref 3.5–5.1)
PROT SERPL-MCNC: 6 G/DL (ref 6.4–8.9)
RBC # BLD AUTO: 4.46 10E12/L (ref 3.8–5.2)
RBC #/AREA URNS AUTO: ABNORMAL /HPF
SODIUM SERPL-SCNC: 144 MMOL/L (ref 134–144)
SOURCE: ABNORMAL
SP GR UR STRIP: >1.03 (ref 1–1.03)
TSH SERPL DL<=0.05 MIU/L-ACNC: 1.25 IU/ML (ref 0.34–5.6)
UROBILINOGEN UR STRIP-ACNC: 0.2 EU/DL (ref 0.2–1)
WBC # BLD AUTO: 6.9 10E9/L (ref 4–11)
WBC #/AREA URNS AUTO: ABNORMAL /HPF

## 2019-07-26 PROCEDURE — 87798 DETECT AGENT NOS DNA AMP: CPT | Performed by: FAMILY MEDICINE

## 2019-07-26 PROCEDURE — 85025 COMPLETE CBC W/AUTO DIFF WBC: CPT | Performed by: FAMILY MEDICINE

## 2019-07-26 PROCEDURE — 87088 URINE BACTERIA CULTURE: CPT | Performed by: FAMILY MEDICINE

## 2019-07-26 PROCEDURE — 81001 URINALYSIS AUTO W/SCOPE: CPT | Mod: XU | Performed by: FAMILY MEDICINE

## 2019-07-26 PROCEDURE — 99283 EMERGENCY DEPT VISIT LOW MDM: CPT | Performed by: FAMILY MEDICINE

## 2019-07-26 PROCEDURE — 81001 URINALYSIS AUTO W/SCOPE: CPT | Performed by: FAMILY MEDICINE

## 2019-07-26 PROCEDURE — 80053 COMPREHEN METABOLIC PANEL: CPT | Performed by: FAMILY MEDICINE

## 2019-07-26 PROCEDURE — 99283 EMERGENCY DEPT VISIT LOW MDM: CPT | Mod: Z6 | Performed by: FAMILY MEDICINE

## 2019-07-26 PROCEDURE — 86140 C-REACTIVE PROTEIN: CPT | Performed by: FAMILY MEDICINE

## 2019-07-26 PROCEDURE — 87086 URINE CULTURE/COLONY COUNT: CPT | Performed by: FAMILY MEDICINE

## 2019-07-26 PROCEDURE — 84443 ASSAY THYROID STIM HORMONE: CPT | Performed by: FAMILY MEDICINE

## 2019-07-26 PROCEDURE — 86618 LYME DISEASE ANTIBODY: CPT | Performed by: FAMILY MEDICINE

## 2019-07-26 PROCEDURE — 36415 COLL VENOUS BLD VENIPUNCTURE: CPT | Performed by: FAMILY MEDICINE

## 2019-07-26 RX ORDER — SULFACETAMIDE SODIUM 100 MG/ML
1-2 SOLUTION/ DROPS OPHTHALMIC
Qty: 1 BOTTLE | Refills: 0 | Status: SHIPPED | OUTPATIENT
Start: 2019-07-26 | End: 2019-08-02

## 2019-07-26 RX ORDER — DOXYCYCLINE 100 MG/1
100 CAPSULE ORAL 2 TIMES DAILY
Qty: 28 CAPSULE | Refills: 0 | Status: SHIPPED | OUTPATIENT
Start: 2019-07-26 | End: 2019-08-09

## 2019-07-26 ASSESSMENT — ENCOUNTER SYMPTOMS
HEMATOLOGIC/LYMPHATIC NEGATIVE: 1
FATIGUE: 1
FEVER: 0
ACTIVITY CHANGE: 1
MYALGIAS: 1
PSYCHIATRIC NEGATIVE: 1
NAUSEA: 0
CONSTIPATION: 0
SHORTNESS OF BREATH: 0
VOMITING: 0
DIARRHEA: 0
APPETITE CHANGE: 0
BACK PAIN: 0
DIAPHORESIS: 0
ARTHRALGIAS: 1

## 2019-07-26 ASSESSMENT — MIFFLIN-ST. JEOR: SCORE: 1459.26
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
## 2019-07-26 NOTE — ED AVS SNAPSHOT
[facility]  [address]  [place] [address]  Phone:  [phone]  Fax:  [phone]                                    Marlee Wise   MRN: [id number]    Department:  [facility]   Date of Visit:  7/26/2019           After Visit Summary Signature Page    I have received my discharge instructions, and my questions have been answered. I have discussed any challenges I see with this plan with the nurse or doctor.    ..........................................................................................................................................  Patient/Patient Representative Signature      ..........................................................................................................................................  Patient Representative Print Name and Relationship to Patient    ..................................................               ................................................  Date                                   Time    ..........................................................................................................................................  Reviewed by Signature/Title    ...................................................              ..............................................  Date                                               Time          22EPIC Rev 08/18

## 2019-07-26 NOTE — ED TRIAGE NOTES
Patient reporting finding tick on body around 3 weeks ago. Patient now very lethargic, weak, fevers and generalized body aches. Patient reporting boyfriend tested positive for Lymes disease and was treated. Patients symptoms not getting better.

## 2019-07-26 NOTE — DISCHARGE INSTRUCTIONS
Take antibiotics as prescribed.   Use drops to left eye as prescribed along with warms packs.   Followup with your primary regarding ongoing care/ results of labs

## 2019-07-28 LAB
B BURGDOR IGG+IGM SER QL: 0.09 (ref 0–0.89)
BACTERIA SPEC CULT: ABNORMAL
SPECIMEN SOURCE: ABNORMAL

## 2019-08-01 LAB
A PHAGOCYTOPH DNA BLD QL NAA+PROBE: NOT DETECTED
B MICROTI DNA SPEC QL NAA+PROBE: NOT DETECTED
BABESIA DNA SPEC QL NAA+PROBE: NOT DETECTED
E CHAFFEENSIS DNA BLD QL NAA+PROBE: NOT DETECTED
E EWINGII DNA SPEC QL NAA+PROBE: NOT DETECTED
EHRLICHIA DNA SPEC QL NAA+PROBE: NOT DETECTED

## 2019-08-01 NOTE — RESULT ENCOUNTER NOTE
Final result for Babesia Species by PCR is [NEGATIVE] and Babesia Microtic by PCR is [NEGATIVE].  No change in treatment per [facility] Lab Result protocol.

## 2020-03-11 ENCOUNTER — HEALTH MAINTENANCE LETTER (OUTPATIENT)
Age: 40
End: 2020-03-11

## 2020-03-25 NOTE — DISCHARGE INSTRUCTIONS
[facility]   Adult Discharge Orders & Instructions     For 24 hours after surgery    1. Get plenty of rest.  A responsible adult must stay with you for at least 24 hours after you leave the hospital.   2. Do not drive or use heavy equipment.  If you have weakness or tingling, don't drive or use heavy equipment until this feeling goes away.  3. Do not drink alcohol.  4. Avoid strenuous or risky activities.  Ask for help when climbing stairs.   5. You may feel lightheaded.  IF so, sit for a few minutes before standing.  Have someone help you get up.   6. If you have nausea (feel sick to your stomach): Drink only clear liquids such as apple juice, ginger ale, broth or 7-Up.  Rest may also help.  Be sure to drink enough fluids.  Move to a regular diet as you feel able.  7. You may have a slight fever. Call the doctor if your fever is over 101 F (38.3 C) (taken under the tongue) or lasts longer than 24 hours.  8. You may have a dry mouth, a sore throat, muscle aches or trouble sleeping.  These should go away after 24 hours.  9. Do not make important or legal decisions.   Call your doctor for any of the followin.  Signs of infection (fever, growing tenderness at the surgery site, a large amount of drainage or bleeding, severe pain, foul-smelling drainage, redness, swelling).    2. It has been over 8 to 10 hours since surgery and you are still not able to urinate (pass water).    3.  Headache for over 24 hours.    4.  Numbness, tingling or weakness the day after surgery (if you had spinal anesthesia).  To contact a doctor, call:   [phone]   Can you check a daniel on her?

## 2020-09-10 ENCOUNTER — ALLIED HEALTH/NURSE VISIT (OUTPATIENT)
Dept: FAMILY MEDICINE | Facility: OTHER | Age: 40
End: 2020-09-10
Payer: OTHER GOVERNMENT

## 2020-09-10 DIAGNOSIS — R05.9 COUGH: Primary | ICD-10-CM

## 2020-09-10 PROCEDURE — U0003 INFECTIOUS AGENT DETECTION BY NUCLEIC ACID (DNA OR RNA); SEVERE ACUTE RESPIRATORY SYNDROME CORONAVIRUS 2 (SARS-COV-2) (CORONAVIRUS DISEASE [COVID-19]), AMPLIFIED PROBE TECHNIQUE, MAKING USE OF HIGH THROUGHPUT TECHNOLOGIES AS DESCRIBED BY CMS-2020-01-R: HCPCS | Mod: ZL

## 2020-09-10 PROCEDURE — 99207 ZZC NO CHARGE NURSE ONLY: CPT

## 2020-09-10 PROCEDURE — C9803 HOPD COVID-19 SPEC COLLECT: HCPCS

## 2020-09-12 LAB
SARS-COV-2 RNA SPEC QL NAA+PROBE: NOT DETECTED
SPECIMEN SOURCE: NORMAL

## 2020-12-27 ENCOUNTER — HEALTH MAINTENANCE LETTER (OUTPATIENT)
Age: 40
End: 2020-12-27

## 2021-04-25 ENCOUNTER — HEALTH MAINTENANCE LETTER (OUTPATIENT)
Age: 41
End: 2021-04-25

## 2021-10-09 ENCOUNTER — HEALTH MAINTENANCE LETTER (OUTPATIENT)
Age: 41
End: 2021-10-09

## 2021-12-15 ENCOUNTER — ALLIED HEALTH/NURSE VISIT (OUTPATIENT)
Dept: FAMILY MEDICINE | Facility: OTHER | Age: 41
End: 2021-12-15

## 2021-12-15 ENCOUNTER — LAB REQUISITION (OUTPATIENT)
Dept: LAB | Facility: OTHER | Age: 41
End: 2021-12-15

## 2021-12-15 ENCOUNTER — APPOINTMENT (OUTPATIENT)
Dept: LAB | Facility: OTHER | Age: 41
End: 2021-12-15

## 2021-12-15 DIAGNOSIS — Z02.1 ENCOUNTER FOR PRE-EMPLOYMENT EXAMINATION: ICD-10-CM

## 2021-12-15 DIAGNOSIS — Z23 NEED FOR INFLUENZA VACCINATION: Primary | ICD-10-CM

## 2021-12-15 PROCEDURE — 90686 IIV4 VACC NO PRSV 0.5 ML IM: CPT

## 2021-12-15 PROCEDURE — 86481 TB AG RESPONSE T-CELL SUSP: CPT | Mod: ZL | Performed by: FAMILY MEDICINE

## 2021-12-15 PROCEDURE — 90471 IMMUNIZATION ADMIN: CPT

## 2021-12-15 PROCEDURE — 36415 COLL VENOUS BLD VENIPUNCTURE: CPT | Mod: ZL | Performed by: FAMILY MEDICINE

## 2021-12-17 LAB
GAMMA INTERFERON BACKGROUND BLD IA-ACNC: 0.03 IU/ML
M TB IFN-G BLD-IMP: NEGATIVE
M TB IFN-G CD4+ BCKGRND COR BLD-ACNC: 9.97 IU/ML
MITOGEN IGNF BCKGRD COR BLD-ACNC: -0.03 IU/ML
MITOGEN IGNF BCKGRD COR BLD-ACNC: -0.03 IU/ML
QUANTIFERON MITOGEN: 10 IU/ML
QUANTIFERON NIL TUBE: 0.03 IU/ML
QUANTIFERON TB1 TUBE: 0 IU/ML
QUANTIFERON TB2 TUBE: 0

## 2022-02-02 ENCOUNTER — ALLIED HEALTH/NURSE VISIT (OUTPATIENT)
Dept: FAMILY MEDICINE | Facility: OTHER | Age: 42
End: 2022-02-02
Attending: FAMILY MEDICINE

## 2022-02-02 DIAGNOSIS — Z20.822 COVID-19 RULED OUT: Primary | ICD-10-CM

## 2022-02-02 PROCEDURE — U0005 INFEC AGEN DETEC AMPLI PROBE: HCPCS | Mod: ZL

## 2022-02-02 PROCEDURE — C9803 HOPD COVID-19 SPEC COLLECT: HCPCS

## 2022-02-04 LAB — SARS-COV-2 RNA RESP QL NAA+PROBE: NOT DETECTED

## 2022-05-21 ENCOUNTER — HEALTH MAINTENANCE LETTER (OUTPATIENT)
Age: 42
End: 2022-05-21

## 2022-09-17 ENCOUNTER — HEALTH MAINTENANCE LETTER (OUTPATIENT)
Age: 42
End: 2022-09-17

## 2022-09-28 ENCOUNTER — HOSPITAL ENCOUNTER (EMERGENCY)
Facility: OTHER | Age: 42
Discharge: HOME | End: 2022-09-28
Attending: FAMILY MEDICINE | Admitting: FAMILY MEDICINE

## 2022-09-28 ENCOUNTER — APPOINTMENT (OUTPATIENT)
Dept: CT IMAGING | Facility: OTHER | Age: 42
End: 2022-09-28
Attending: FAMILY MEDICINE

## 2022-09-28 VITALS
HEIGHT: 67 IN | TEMPERATURE: 97.5 F | WEIGHT: 165 LBS | OXYGEN SATURATION: 99 % | RESPIRATION RATE: 18 BRPM | HEART RATE: 78 BPM | SYSTOLIC BLOOD PRESSURE: 121 MMHG | DIASTOLIC BLOOD PRESSURE: 88 MMHG | BODY MASS INDEX: 25.9 KG/M2
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
## 2022-09-28 DIAGNOSIS — R07.89 LEFT-SIDED CHEST WALL PAIN: ICD-10-CM

## 2022-09-28 LAB
ALBUMIN SERPL-MCNC: 4.7 G/DL (ref 3.5–5.7)
ALP SERPL-CCNC: 68 U/L (ref 34–104)
ALT SERPL W P-5'-P-CCNC: 22 U/L (ref 7–52)
ANION GAP SERPL CALCULATED.3IONS-SCNC: 8 MMOL/L (ref 3–14)
AST SERPL W P-5'-P-CCNC: 23 U/L (ref 13–39)
BASOPHILS # BLD AUTO: 0 10E3/UL (ref 0–0.2)
BASOPHILS NFR BLD AUTO: 1 %
BILIRUB SERPL-MCNC: 0.7 MG/DL (ref 0.3–1)
BUN SERPL-MCNC: 11 MG/DL (ref 7–25)
CALCIUM SERPL-MCNC: 9.6 MG/DL (ref 8.6–10.3)
CHLORIDE BLD-SCNC: 106 MMOL/L (ref 98–107)
CO2 SERPL-SCNC: 25 MMOL/L (ref 21–31)
CREAT SERPL-MCNC: 0.8 MG/DL (ref 0.6–1.2)
EOSINOPHIL # BLD AUTO: 0.2 10E3/UL (ref 0–0.7)
EOSINOPHIL NFR BLD AUTO: 4 %
ERYTHROCYTE [DISTWIDTH] IN BLOOD BY AUTOMATED COUNT: 12.1 % (ref 10–15)
GFR SERPL CREATININE-BSD FRML MDRD: >90 ML/MIN/1.73M2
GLUCOSE BLD-MCNC: 102 MG/DL (ref 70–105)
HCT VFR BLD AUTO: 40 % (ref 35–47)
HGB BLD-MCNC: 13.9 G/DL (ref 11.7–15.7)
HOLD SPECIMEN: NORMAL
HOLD SPECIMEN: NORMAL
IMM GRANULOCYTES # BLD: 0 10E3/UL
IMM GRANULOCYTES NFR BLD: 0 %
LIPASE SERPL-CCNC: 15 U/L (ref 11–82)
LYMPHOCYTES # BLD AUTO: 1.4 10E3/UL (ref 0.8–5.3)
LYMPHOCYTES NFR BLD AUTO: 26 %
MCH RBC QN AUTO: 31.5 PG (ref 26.5–33)
MCHC RBC AUTO-ENTMCNC: 34.8 G/DL (ref 31.5–36.5)
MCV RBC AUTO: 91 FL (ref 78–100)
MONOCYTES # BLD AUTO: 0.5 10E3/UL (ref 0–1.3)
MONOCYTES NFR BLD AUTO: 9 %
NEUTROPHILS # BLD AUTO: 3.3 10E3/UL (ref 1.6–8.3)
NEUTROPHILS NFR BLD AUTO: 60 %
NRBC # BLD AUTO: 0 10E3/UL
NRBC BLD AUTO-RTO: 0 /100
PLATELET # BLD AUTO: 271 10E3/UL (ref 150–450)
POTASSIUM BLD-SCNC: 3.6 MMOL/L (ref 3.5–5.1)
PROT SERPL-MCNC: 6.6 G/DL (ref 6.4–8.9)
RBC # BLD AUTO: 4.41 10E6/UL (ref 3.8–5.2)
SODIUM SERPL-SCNC: 139 MMOL/L (ref 134–144)
WBC # BLD AUTO: 5.5 10E3/UL (ref 4–11)

## 2022-09-28 PROCEDURE — 250N000011 HC RX IP 250 OP 636: Performed by: FAMILY MEDICINE

## 2022-09-28 PROCEDURE — 99283 EMERGENCY DEPT VISIT LOW MDM: CPT | Performed by: FAMILY MEDICINE

## 2022-09-28 PROCEDURE — 258N000003 HC RX IP 258 OP 636: Performed by: FAMILY MEDICINE

## 2022-09-28 PROCEDURE — 83690 ASSAY OF LIPASE: CPT | Performed by: FAMILY MEDICINE

## 2022-09-28 PROCEDURE — 36592 COLLECT BLOOD FROM PICC: CPT | Performed by: FAMILY MEDICINE

## 2022-09-28 PROCEDURE — 80053 COMPREHEN METABOLIC PANEL: CPT | Performed by: FAMILY MEDICINE

## 2022-09-28 PROCEDURE — 96376 TX/PRO/DX INJ SAME DRUG ADON: CPT | Mod: XU | Performed by: FAMILY MEDICINE

## 2022-09-28 PROCEDURE — 96375 TX/PRO/DX INJ NEW DRUG ADDON: CPT | Mod: XU | Performed by: FAMILY MEDICINE

## 2022-09-28 PROCEDURE — 96374 THER/PROPH/DIAG INJ IV PUSH: CPT | Mod: XU | Performed by: FAMILY MEDICINE

## 2022-09-28 PROCEDURE — 85025 COMPLETE CBC W/AUTO DIFF WBC: CPT | Performed by: FAMILY MEDICINE

## 2022-09-28 PROCEDURE — 74177 CT ABD & PELVIS W/CONTRAST: CPT

## 2022-09-28 PROCEDURE — 99285 EMERGENCY DEPT VISIT HI MDM: CPT | Mod: 25 | Performed by: FAMILY MEDICINE

## 2022-09-28 RX ORDER — ONDANSETRON 2 MG/ML
4 INJECTION INTRAMUSCULAR; INTRAVENOUS
Status: DISCONTINUED | OUTPATIENT
Start: 2022-09-28 | End: 2022-09-28 | Stop reason: HOSPADM

## 2022-09-28 RX ORDER — HYDROCODONE BITARTRATE AND ACETAMINOPHEN 5; 325 MG/1; MG/1
1 TABLET ORAL AT BEDTIME
Qty: 3 TABLET | Refills: 0 | Status: SHIPPED | OUTPATIENT
Start: 2022-09-28 | End: 2022-10-01

## 2022-09-28 RX ORDER — SODIUM CHLORIDE 9 MG/ML
INJECTION, SOLUTION INTRAVENOUS CONTINUOUS
Status: DISCONTINUED | OUTPATIENT
Start: 2022-09-28 | End: 2022-09-28 | Stop reason: HOSPADM

## 2022-09-28 RX ORDER — IOPAMIDOL 755 MG/ML
90 INJECTION, SOLUTION INTRAVASCULAR ONCE
Status: COMPLETED | OUTPATIENT
Start: 2022-09-28 | End: 2022-09-28

## 2022-09-28 RX ORDER — FENTANYL CITRATE 50 UG/ML
50 INJECTION, SOLUTION INTRAMUSCULAR; INTRAVENOUS EVERY 30 MIN PRN
Status: DISCONTINUED | OUTPATIENT
Start: 2022-09-28 | End: 2022-09-28 | Stop reason: HOSPADM

## 2022-09-28 RX ADMIN — FENTANYL CITRATE 50 MCG: 50 INJECTION, SOLUTION INTRAMUSCULAR; INTRAVENOUS at 09:55

## 2022-09-28 RX ADMIN — SODIUM CHLORIDE: 9 INJECTION, SOLUTION INTRAVENOUS at 08:33

## 2022-09-28 RX ADMIN — ONDANSETRON 4 MG: 2 INJECTION INTRAMUSCULAR; INTRAVENOUS at 08:33

## 2022-09-28 RX ADMIN — FENTANYL CITRATE 50 MCG: 50 INJECTION, SOLUTION INTRAMUSCULAR; INTRAVENOUS at 08:33

## 2022-09-28 RX ADMIN — IOPAMIDOL 90 ML: 755 INJECTION, SOLUTION INTRAVENOUS at 09:23

## 2022-09-28 ASSESSMENT — ACTIVITIES OF DAILY LIVING (ADL)
ADLS_ACUITY_SCORE: 35
ADLS_ACUITY_SCORE: 35

## 2022-09-28 NOTE — ED TRIAGE NOTES
Pt states fell about 5 feet into her crawl space this morning around 0600, complains of left side rib pain, hurts to breathe.      Triage Assessment     Row Name 09/28/22 8262       Triage Assessment (Adult)    Airway WDL WDL       Respiratory WDL    Respiratory WDL WDL       Skin Circulation/Temperature WDL    Skin Circulation/Temperature WDL WDL       Cardiac WDL    Cardiac WDL WDL       Peripheral/Neurovascular WDL    Peripheral Neurovascular WDL WDL       Cognitive/Neuro/Behavioral WDL    Cognitive/Neuro/Behavioral WDL WDL
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
## 2022-09-28 NOTE — DISCHARGE INSTRUCTIONS
Marlee    I hope you feel better soon. I did send a Rx for Vicodin to the [facility] in [place]. Take this at bedtime.  DO NOT DRIVE FOR SIX HOURS AFTER TAKING VICODIN.    Thank you for choosing our Emergency Department for your care.     You may receive a phone call or letter for a survey about your care in our ED.  Please complete this as this is how we improve care for our patients.     If you have any questions after leaving the ED you can call or text me on my cell phone at [phone].    Sincerely,    Dr Gerry Shukla M.D.

## 2022-09-28 NOTE — LETTER
September 28, 2022      To Whom It May Concern:      Marlee Michael was seen in our Emergency Department today, 09/28/22.  I expect her condition to improve over the next few days.  She may return to work when improved.    Sincerely,              Mark Shukla MD

## 2022-09-28 NOTE — PROGRESS NOTES
:    Met with patient in room in regards to financial support.      Patient stated she does not have any medical insurance coverage right now.    Asked if patient would like to have a   come and visit with her.  Patient asked if she should get their contact information and she would follow up with them when she felt better.    Gave patient contact information for Dariel[facility]  so patient can follow up at a later time.    Patient did not have any more questions or concerns at this time.    No further needs at this time.    VICKI Villanueva on 9/28/2022 at 10:01 AM

## 2023-06-04 ENCOUNTER — HEALTH MAINTENANCE LETTER (OUTPATIENT)
Age: 43
End: 2023-06-04

## 2024-02-24 ENCOUNTER — HEALTH MAINTENANCE LETTER (OUTPATIENT)
Age: 44
End: 2024-02-24

## 2024-07-13 ENCOUNTER — HEALTH MAINTENANCE LETTER (OUTPATIENT)
Age: 44
End: 2024-07-13

## 2025-05-09 ENCOUNTER — APPOINTMENT (OUTPATIENT)
Dept: GENERAL RADIOLOGY | Facility: OTHER | Age: 45
End: 2025-05-09
Attending: PHYSICIAN ASSISTANT
Payer: COMMERCIAL

## 2025-05-09 ENCOUNTER — HOSPITAL ENCOUNTER (EMERGENCY)
Facility: OTHER | Age: 45
Discharge: HOME | End: 2025-05-09
Attending: PHYSICIAN ASSISTANT
Payer: COMMERCIAL

## 2025-05-09 VITALS
WEIGHT: 160 LBS | DIASTOLIC BLOOD PRESSURE: 91 MMHG | OXYGEN SATURATION: 97 % | RESPIRATION RATE: 17 BRPM | TEMPERATURE: 98.7 F | HEIGHT: 67 IN | BODY MASS INDEX: 25.11 KG/M2 | SYSTOLIC BLOOD PRESSURE: 141 MMHG | HEART RATE: 72 BPM

## 2025-05-09 DIAGNOSIS — S90.32XA CONTUSION OF LEFT FOOT, INITIAL ENCOUNTER: ICD-10-CM
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
## 2025-05-09 DIAGNOSIS — S93.602A SPRAIN OF LEFT FOOT, INITIAL ENCOUNTER: ICD-10-CM

## 2025-05-09 DIAGNOSIS — S91.352A DOG BITE OF LEFT FOOT, INITIAL ENCOUNTER: ICD-10-CM

## 2025-05-09 LAB
ALBUMIN SERPL BCG-MCNC: 4.7 G/DL (ref 3.5–5.2)
ALP SERPL-CCNC: 94 U/L (ref 40–150)
ALT SERPL W P-5'-P-CCNC: 44 U/L (ref 0–50)
ANION GAP SERPL CALCULATED.3IONS-SCNC: 13 MMOL/L (ref 7–15)
AST SERPL W P-5'-P-CCNC: 33 U/L (ref 0–45)
BASOPHILS # BLD AUTO: 0.1 10E3/UL (ref 0–0.2)
BASOPHILS NFR BLD AUTO: 1 %
BILIRUB SERPL-MCNC: 0.3 MG/DL
BUN SERPL-MCNC: 9.5 MG/DL (ref 6–20)
CALCIUM SERPL-MCNC: 9.4 MG/DL (ref 8.8–10.4)
CHLORIDE SERPL-SCNC: 102 MMOL/L (ref 98–107)
CREAT SERPL-MCNC: 0.62 MG/DL (ref 0.51–0.95)
CRP SERPL-MCNC: 23.27 MG/L
EGFRCR SERPLBLD CKD-EPI 2021: >90 ML/MIN/1.73M2
EOSINOPHIL # BLD AUTO: 0.2 10E3/UL (ref 0–0.7)
EOSINOPHIL NFR BLD AUTO: 2 %
ERYTHROCYTE [DISTWIDTH] IN BLOOD BY AUTOMATED COUNT: 12.4 % (ref 10–15)
ERYTHROCYTE [SEDIMENTATION RATE] IN BLOOD BY WESTERGREN METHOD: 14 MM/HR (ref 0–20)
GLUCOSE SERPL-MCNC: 93 MG/DL (ref 70–99)
HCO3 SERPL-SCNC: 24 MMOL/L (ref 22–29)
HCT VFR BLD AUTO: 38.8 % (ref 35–47)
HGB BLD-MCNC: 13.5 G/DL (ref 11.7–15.7)
HOLD SPECIMEN: NORMAL
HOLD SPECIMEN: NORMAL
IMM GRANULOCYTES # BLD: 0 10E3/UL
IMM GRANULOCYTES NFR BLD: 0 %
LACTATE SERPL-SCNC: 0.8 MMOL/L (ref 0.7–2)
LYMPHOCYTES # BLD AUTO: 1.7 10E3/UL (ref 0.8–5.3)
LYMPHOCYTES NFR BLD AUTO: 20 %
MCH RBC QN AUTO: 32 PG (ref 26.5–33)
MCHC RBC AUTO-ENTMCNC: 34.8 G/DL (ref 31.5–36.5)
MCV RBC AUTO: 92 FL (ref 78–100)
MONOCYTES # BLD AUTO: 0.6 10E3/UL (ref 0–1.3)
MONOCYTES NFR BLD AUTO: 7 %
NEUTROPHILS # BLD AUTO: 6 10E3/UL (ref 1.6–8.3)
NEUTROPHILS NFR BLD AUTO: 71 %
NRBC # BLD AUTO: 0 10E3/UL
NRBC BLD AUTO-RTO: 0 /100
PLATELET # BLD AUTO: 349 10E3/UL (ref 150–450)
POTASSIUM SERPL-SCNC: 3.8 MMOL/L (ref 3.4–5.3)
PROT SERPL-MCNC: 7.1 G/DL (ref 6.4–8.3)
RBC # BLD AUTO: 4.22 10E6/UL (ref 3.8–5.2)
SODIUM SERPL-SCNC: 139 MMOL/L (ref 135–145)
WBC # BLD AUTO: 8.5 10E3/UL (ref 4–11)

## 2025-05-09 PROCEDURE — 250N000013 HC RX MED GY IP 250 OP 250 PS 637: Performed by: PHYSICIAN ASSISTANT

## 2025-05-09 PROCEDURE — 96361 HYDRATE IV INFUSION ADD-ON: CPT | Performed by: PHYSICIAN ASSISTANT

## 2025-05-09 PROCEDURE — 73630 X-RAY EXAM OF FOOT: CPT | Mod: 26 | Performed by: RADIOLOGY

## 2025-05-09 PROCEDURE — 87040 BLOOD CULTURE FOR BACTERIA: CPT | Performed by: PHYSICIAN ASSISTANT

## 2025-05-09 PROCEDURE — 86140 C-REACTIVE PROTEIN: CPT | Performed by: PHYSICIAN ASSISTANT

## 2025-05-09 PROCEDURE — 85652 RBC SED RATE AUTOMATED: CPT | Performed by: PHYSICIAN ASSISTANT

## 2025-05-09 PROCEDURE — 96375 TX/PRO/DX INJ NEW DRUG ADDON: CPT | Performed by: PHYSICIAN ASSISTANT

## 2025-05-09 PROCEDURE — 80053 COMPREHEN METABOLIC PANEL: CPT | Performed by: PHYSICIAN ASSISTANT

## 2025-05-09 PROCEDURE — 99284 EMERGENCY DEPT VISIT MOD MDM: CPT | Performed by: PHYSICIAN ASSISTANT

## 2025-05-09 PROCEDURE — 36415 COLL VENOUS BLD VENIPUNCTURE: CPT | Performed by: PHYSICIAN ASSISTANT

## 2025-05-09 PROCEDURE — 73630 X-RAY EXAM OF FOOT: CPT | Mod: LT

## 2025-05-09 PROCEDURE — 99284 EMERGENCY DEPT VISIT MOD MDM: CPT | Mod: 25 | Performed by: PHYSICIAN ASSISTANT

## 2025-05-09 PROCEDURE — 250N000011 HC RX IP 250 OP 636: Performed by: PHYSICIAN ASSISTANT

## 2025-05-09 PROCEDURE — 85004 AUTOMATED DIFF WBC COUNT: CPT | Performed by: PHYSICIAN ASSISTANT

## 2025-05-09 PROCEDURE — 83605 ASSAY OF LACTIC ACID: CPT | Performed by: PHYSICIAN ASSISTANT

## 2025-05-09 PROCEDURE — 96365 THER/PROPH/DIAG IV INF INIT: CPT | Performed by: PHYSICIAN ASSISTANT

## 2025-05-09 PROCEDURE — 250N000009 HC RX 250: Performed by: PHYSICIAN ASSISTANT

## 2025-05-09 PROCEDURE — 258N000003 HC RX IP 258 OP 636: Performed by: PHYSICIAN ASSISTANT

## 2025-05-09 RX ORDER — GINSENG 100 MG
500 CAPSULE ORAL ONCE
Status: COMPLETED | OUTPATIENT
Start: 2025-05-09 | End: 2025-05-09

## 2025-05-09 RX ORDER — PIPERACILLIN SODIUM, TAZOBACTAM SODIUM 4; .5 G/20ML; G/20ML
4.5 INJECTION, POWDER, LYOPHILIZED, FOR SOLUTION INTRAVENOUS ONCE
Status: COMPLETED | OUTPATIENT
Start: 2025-05-09 | End: 2025-05-09

## 2025-05-09 RX ORDER — ACETAMINOPHEN 500 MG
1000 TABLET ORAL ONCE
Status: COMPLETED | OUTPATIENT
Start: 2025-05-09 | End: 2025-05-09

## 2025-05-09 RX ORDER — OXYCODONE AND ACETAMINOPHEN 5; 325 MG/1; MG/1
1 TABLET ORAL EVERY 6 HOURS PRN
Qty: 12 TABLET | Refills: 0 | Status: SHIPPED | OUTPATIENT
Start: 2025-05-09

## 2025-05-09 RX ORDER — KETOROLAC TROMETHAMINE 30 MG/ML
30 INJECTION, SOLUTION INTRAMUSCULAR; INTRAVENOUS ONCE
Status: COMPLETED | OUTPATIENT
Start: 2025-05-09 | End: 2025-05-09

## 2025-05-09 RX ADMIN — PIPERACILLIN AND TAZOBACTAM 4.5 G: 4; .5 INJECTION, POWDER, LYOPHILIZED, FOR SOLUTION INTRAVENOUS at 17:36

## 2025-05-09 RX ADMIN — BACITRACIN 1 G: 500 OINTMENT TOPICAL at 18:42

## 2025-05-09 RX ADMIN — KETOROLAC TROMETHAMINE 30 MG: 30 INJECTION, SOLUTION INTRAMUSCULAR at 17:16

## 2025-05-09 RX ADMIN — SODIUM CHLORIDE 1000 ML: 0.9 INJECTION, SOLUTION INTRAVENOUS at 17:15

## 2025-05-09 RX ADMIN — ACETAMINOPHEN 1000 MG: 500 TABLET, FILM COATED ORAL at 17:16

## 2025-05-09 ASSESSMENT — COLUMBIA-SUICIDE SEVERITY RATING SCALE - C-SSRS
2. HAVE YOU ACTUALLY HAD ANY THOUGHTS OF KILLING YOURSELF IN THE PAST MONTH?: NO
1. IN THE PAST MONTH, HAVE YOU WISHED YOU WERE DEAD OR WISHED YOU COULD GO TO SLEEP AND NOT WAKE UP?: NO
6. HAVE YOU EVER DONE ANYTHING, STARTED TO DO ANYTHING, OR PREPARED TO DO ANYTHING TO END YOUR LIFE?: NO

## 2025-05-09 ASSESSMENT — ACTIVITIES OF DAILY LIVING (ADL)
ADLS_ACUITY_SCORE: 41
ADLS_ACUITY_SCORE: 41

## 2025-05-09 NOTE — DISCHARGE INSTRUCTIONS
- Starting tomorrow morning, take Augmentin twice daily for left foot cellulitis secondary to dog bite.  You were given 1 dose of IV Zosyn for antibiotics here in the ER.  -Use ibuprofen 600 mg and Percocet 1 tablet every 6 hours for inflammation and pain.  -Use crutches as needed for ambulation.  -Shower normally.  Use soap and water.  No hydrogen peroxide.  Do not soak in the bathtub.  No swimming in lakes, rivers, oceans, pools, or hot tubs until wound is completely healed.  -Please return to the ER if you have worsening symptoms despite being on oral antibiotics to include worsening foot pain, redness, swelling, fever, streaking into the left leg, or any other concerning symptom.  - Recommend following up with your primary care doctor next week for reevaluation.

## 2025-05-09 NOTE — ED TRIAGE NOTES
Pt here via private car.  Pt states that she was playing with her dogs and was going to kick a ball and when she did she ended up hitting her dog and one of the dog's tooth punctured the top of her left foot.  Pt took her walking boot off and the foot does appear swollen and red.  Pt last took ibuprofen 4 hours ago.
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
## 2025-05-14 LAB
BACTERIA SPEC CULT: NO GROWTH
BACTERIA SPEC CULT: NO GROWTH

## 2025-07-19 ENCOUNTER — HEALTH MAINTENANCE LETTER (OUTPATIENT)
Age: 45
End: 2025-07-19

## (undated) DEVICE — DRAPE LAVH/LAPAROSCOPY W/POUCH 29474

## (undated) DEVICE — DRSG MEDIPORE 2X2 3/4" 3562

## (undated) DEVICE — ENDO TROCAR SLEEVE KII 5X100MM CTR03

## (undated) DEVICE — PACK LAPAROSCOPY LF SBA15LPFCA

## (undated) DEVICE — TUBING INSUFFLATOR W/FILTER OLYMPUS WA95005A

## (undated) DEVICE — ESU GROUND PAD ADULT W/CORD E7507

## (undated) DEVICE — SU MONOCRYL 3-0 PS-2 18" UND Y497G

## (undated) DEVICE — PREP CHLORAPREP 26ML TINTED ORANGE  260815

## (undated) DEVICE — ENDO TROCAR FIRST ENTRY KII FIOS ADV FIX 05X100MM CFF03

## (undated) DEVICE — SOL WATER 1500ML

## (undated) DEVICE — GLOVE PROTEXIS POWDER FREE SMT 7.5  2D72PT75X

## (undated) DEVICE — SU DERMABOND ADVANCED .7ML DNX12

## (undated) DEVICE — SU VICRYL 0 UR-6 27" J603H

## (undated) DEVICE — ESU CORD MONOPOLAR 10'  E0510

## (undated) DEVICE — LIGHT HANDLES PLASTIC

## (undated) DEVICE — CATH SELF FEMALE 14FR 6" 214

## (undated) DEVICE — PREP SKIN SCRUB TRAY 4461A

## (undated) DEVICE — SYR 10ML FINGER CONTROL W/O NDL 309695

## (undated) DEVICE — VERRES NEEDLE 120MM DISPOSABLE 12/BX

## (undated) DEVICE — SLEEVE COMPRESSION SCD KNEE MED 74022

## (undated) DEVICE — WAND INSULSCAN 9400-00

## (undated) RX ORDER — ONDANSETRON 2 MG/ML
INJECTION INTRAMUSCULAR; INTRAVENOUS
Status: DISPENSED
Start: 2018-11-08

## (undated) RX ORDER — LIDOCAINE HYDROCHLORIDE 40 MG/ML
SOLUTION TOPICAL
Status: DISPENSED
Start: 2018-11-08

## (undated) RX ORDER — KETAMINE HYDROCHLORIDE 50 MG/ML
INJECTION, SOLUTION INTRAMUSCULAR; INTRAVENOUS
Status: DISPENSED
Start: 2018-11-08

## (undated) RX ORDER — FENTANYL CITRATE 50 UG/ML
INJECTION, SOLUTION INTRAMUSCULAR; INTRAVENOUS
Status: DISPENSED
Start: 2018-11-08

## (undated) RX ORDER — GINSENG 100 MG
CAPSULE ORAL
Status: DISPENSED
Start: 2025-05-09

## (undated) RX ORDER — CEFAZOLIN SODIUM 2 G/100ML
INJECTION, SOLUTION INTRAVENOUS
Status: DISPENSED
Start: 2018-11-08

## (undated) RX ORDER — KETOROLAC TROMETHAMINE 30 MG/ML
INJECTION, SOLUTION INTRAMUSCULAR; INTRAVENOUS
Status: DISPENSED
Start: 2018-11-08

## (undated) RX ORDER — ACETAMINOPHEN 325 MG/1
TABLET ORAL
Status: DISPENSED
Start: 2018-11-08

## (undated) RX ORDER — FENTANYL CITRATE 50 UG/ML
INJECTION, SOLUTION INTRAMUSCULAR; INTRAVENOUS
Status: DISPENSED
Start: 2022-09-28

## (undated) RX ORDER — DEXAMETHASONE SODIUM PHOSPHATE 4 MG/ML
INJECTION, SOLUTION INTRA-ARTICULAR; INTRALESIONAL; INTRAMUSCULAR; INTRAVENOUS; SOFT TISSUE
Status: DISPENSED
Start: 2018-11-08

## (undated) RX ORDER — OXYCODONE HYDROCHLORIDE 5 MG/1
TABLET ORAL
Status: DISPENSED
Start: 2018-11-08

## (undated) RX ORDER — ONDANSETRON 2 MG/ML
INJECTION INTRAMUSCULAR; INTRAVENOUS
Status: DISPENSED
Start: 2022-09-28

## (undated) RX ORDER — PIPERACILLIN SODIUM, TAZOBACTAM SODIUM 4; .5 G/20ML; G/20ML
INJECTION, POWDER, LYOPHILIZED, FOR SOLUTION INTRAVENOUS
Status: DISPENSED
Start: 2025-05-09

## (undated) RX ORDER — LIDOCAINE HYDROCHLORIDE 10 MG/ML
INJECTION, SOLUTION EPIDURAL; INFILTRATION; INTRACAUDAL; PERINEURAL
Status: DISPENSED
Start: 2018-12-07

## (undated) RX ORDER — KETOROLAC TROMETHAMINE 30 MG/ML
INJECTION, SOLUTION INTRAMUSCULAR; INTRAVENOUS
Status: DISPENSED
Start: 2025-05-09

## (undated) RX ORDER — NEOSTIGMINE METHYLSULFATE 0.5 MG/ML
INJECTION INTRAVENOUS
Status: DISPENSED
Start: 2018-11-08

## (undated) RX ORDER — PROPOFOL 10 MG/ML
INJECTION, EMULSION INTRAVENOUS
Status: DISPENSED
Start: 2018-11-08

## (undated) RX ORDER — GLYCOPYRROLATE 0.2 MG/ML
INJECTION, SOLUTION INTRAMUSCULAR; INTRAVENOUS
Status: DISPENSED
Start: 2018-11-08

## (undated) RX ORDER — TRIAMCINOLONE ACETONIDE 40 MG/ML
INJECTION, SUSPENSION INTRA-ARTICULAR; INTRAMUSCULAR
Status: DISPENSED
Start: 2018-12-07

## (undated) RX ORDER — BUPIVACAINE HYDROCHLORIDE 2.5 MG/ML
INJECTION, SOLUTION EPIDURAL; INFILTRATION; INTRACAUDAL
Status: DISPENSED
Start: 2018-11-08

## (undated) RX ORDER — ACETAMINOPHEN 500 MG
TABLET ORAL
Status: DISPENSED
Start: 2025-05-09

## (undated) RX ORDER — LIDOCAINE HYDROCHLORIDE 20 MG/ML
INJECTION, SOLUTION EPIDURAL; INFILTRATION; INTRACAUDAL; PERINEURAL
Status: DISPENSED
Start: 2018-11-08